# Patient Record
Sex: FEMALE | Race: WHITE | NOT HISPANIC OR LATINO | Employment: UNEMPLOYED | URBAN - METROPOLITAN AREA
[De-identification: names, ages, dates, MRNs, and addresses within clinical notes are randomized per-mention and may not be internally consistent; named-entity substitution may affect disease eponyms.]

---

## 2022-01-10 ENCOUNTER — OFFICE VISIT (OUTPATIENT)
Dept: OBSTETRICS AND GYNECOLOGY | Facility: CLINIC | Age: 27
End: 2022-01-10
Payer: COMMERCIAL

## 2022-01-10 ENCOUNTER — LAB VISIT (OUTPATIENT)
Dept: LAB | Facility: HOSPITAL | Age: 27
End: 2022-01-10
Attending: NURSE PRACTITIONER
Payer: COMMERCIAL

## 2022-01-10 VITALS — DIASTOLIC BLOOD PRESSURE: 70 MMHG | HEIGHT: 62 IN | SYSTOLIC BLOOD PRESSURE: 112 MMHG

## 2022-01-10 DIAGNOSIS — O21.9 NAUSEA/VOMITING IN PREGNANCY: ICD-10-CM

## 2022-01-10 DIAGNOSIS — Z32.00 POSSIBLE PREGNANCY: Primary | ICD-10-CM

## 2022-01-10 DIAGNOSIS — Z32.00 POSSIBLE PREGNANCY: ICD-10-CM

## 2022-01-10 LAB
AMORPH CRY URNS QL MICRO: ABNORMAL
BACTERIA #/AREA URNS HPF: ABNORMAL /HPF
BASOPHILS # BLD AUTO: 0.03 K/UL (ref 0–0.2)
BASOPHILS NFR BLD: 0.2 % (ref 0–1.9)
BILIRUB UR QL STRIP: NEGATIVE
CLARITY UR: ABNORMAL
COLOR UR: YELLOW
DIFFERENTIAL METHOD: ABNORMAL
EOSINOPHIL # BLD AUTO: 0.1 K/UL (ref 0–0.5)
EOSINOPHIL NFR BLD: 0.6 % (ref 0–8)
ERYTHROCYTE [DISTWIDTH] IN BLOOD BY AUTOMATED COUNT: 14.2 % (ref 11.5–14.5)
GLUCOSE UR QL STRIP: NEGATIVE
HCG INTACT+B SERPL-ACNC: NORMAL MIU/ML
HCT VFR BLD AUTO: 42.9 % (ref 37–48.5)
HGB BLD-MCNC: 14 G/DL (ref 12–16)
HGB UR QL STRIP: NEGATIVE
IMM GRANULOCYTES # BLD AUTO: 0.11 K/UL (ref 0–0.04)
IMM GRANULOCYTES NFR BLD AUTO: 0.9 % (ref 0–0.5)
KETONES UR QL STRIP: NEGATIVE
LEUKOCYTE ESTERASE UR QL STRIP: ABNORMAL
LYMPHOCYTES # BLD AUTO: 2.9 K/UL (ref 1–4.8)
LYMPHOCYTES NFR BLD: 23.7 % (ref 18–48)
MCH RBC QN AUTO: 28.3 PG (ref 27–31)
MCHC RBC AUTO-ENTMCNC: 32.6 G/DL (ref 32–36)
MCV RBC AUTO: 87 FL (ref 82–98)
MICROSCOPIC COMMENT: ABNORMAL
MONOCYTES # BLD AUTO: 1.1 K/UL (ref 0.3–1)
MONOCYTES NFR BLD: 8.6 % (ref 4–15)
NEUTROPHILS # BLD AUTO: 8.1 K/UL (ref 1.8–7.7)
NEUTROPHILS NFR BLD: 66 % (ref 38–73)
NITRITE UR QL STRIP: NEGATIVE
NRBC BLD-RTO: 0 /100 WBC
PH UR STRIP: 6 [PH] (ref 5–8)
PLATELET # BLD AUTO: 289 K/UL (ref 150–450)
PMV BLD AUTO: 11 FL (ref 9.2–12.9)
PROT UR QL STRIP: NEGATIVE
RBC # BLD AUTO: 4.95 M/UL (ref 4–5.4)
SP GR UR STRIP: >=1.03 (ref 1–1.03)
SQUAMOUS #/AREA URNS HPF: 12 /HPF
URN SPEC COLLECT METH UR: ABNORMAL
WBC # BLD AUTO: 12.34 K/UL (ref 3.9–12.7)
WBC #/AREA URNS HPF: 12 /HPF (ref 0–5)

## 2022-01-10 PROCEDURE — 3078F PR MOST RECENT DIASTOLIC BLOOD PRESSURE < 80 MM HG: ICD-10-PCS | Mod: CPTII,S$GLB,, | Performed by: NURSE PRACTITIONER

## 2022-01-10 PROCEDURE — 99999 PR PBB SHADOW E&M-NEW PATIENT-LVL III: ICD-10-PCS | Mod: PBBFAC,,, | Performed by: NURSE PRACTITIONER

## 2022-01-10 PROCEDURE — 3074F SYST BP LT 130 MM HG: CPT | Mod: CPTII,S$GLB,, | Performed by: NURSE PRACTITIONER

## 2022-01-10 PROCEDURE — 87591 N.GONORRHOEAE DNA AMP PROB: CPT | Performed by: NURSE PRACTITIONER

## 2022-01-10 PROCEDURE — 84702 CHORIONIC GONADOTROPIN TEST: CPT | Performed by: NURSE PRACTITIONER

## 2022-01-10 PROCEDURE — 85025 COMPLETE CBC W/AUTO DIFF WBC: CPT | Performed by: NURSE PRACTITIONER

## 2022-01-10 PROCEDURE — 81000 URINALYSIS NONAUTO W/SCOPE: CPT | Performed by: NURSE PRACTITIONER

## 2022-01-10 PROCEDURE — 86901 BLOOD TYPING SEROLOGIC RH(D): CPT | Performed by: NURSE PRACTITIONER

## 2022-01-10 PROCEDURE — 88141 CYTOPATH C/V INTERPRET: CPT | Mod: ,,, | Performed by: PATHOLOGY

## 2022-01-10 PROCEDURE — 88141 PR  CYTOPATH CERV/VAG INTERPRET: ICD-10-PCS | Mod: ,,, | Performed by: PATHOLOGY

## 2022-01-10 PROCEDURE — 36415 COLL VENOUS BLD VENIPUNCTURE: CPT | Performed by: NURSE PRACTITIONER

## 2022-01-10 PROCEDURE — 81025 PR  URINE PREGNANCY TEST: ICD-10-PCS | Mod: S$GLB,,, | Performed by: NURSE PRACTITIONER

## 2022-01-10 PROCEDURE — 87491 CHLMYD TRACH DNA AMP PROBE: CPT | Performed by: NURSE PRACTITIONER

## 2022-01-10 PROCEDURE — 87086 URINE CULTURE/COLONY COUNT: CPT | Performed by: NURSE PRACTITIONER

## 2022-01-10 PROCEDURE — 3078F DIAST BP <80 MM HG: CPT | Mod: CPTII,S$GLB,, | Performed by: NURSE PRACTITIONER

## 2022-01-10 PROCEDURE — 86762 RUBELLA ANTIBODY: CPT | Performed by: NURSE PRACTITIONER

## 2022-01-10 PROCEDURE — 88175 CYTOPATH C/V AUTO FLUID REDO: CPT | Performed by: PATHOLOGY

## 2022-01-10 PROCEDURE — 1160F RVW MEDS BY RX/DR IN RCRD: CPT | Mod: CPTII,S$GLB,, | Performed by: NURSE PRACTITIONER

## 2022-01-10 PROCEDURE — 1159F PR MEDICATION LIST DOCUMENTED IN MEDICAL RECORD: ICD-10-PCS | Mod: CPTII,S$GLB,, | Performed by: NURSE PRACTITIONER

## 2022-01-10 PROCEDURE — 1159F MED LIST DOCD IN RCRD: CPT | Mod: CPTII,S$GLB,, | Performed by: NURSE PRACTITIONER

## 2022-01-10 PROCEDURE — 80074 ACUTE HEPATITIS PANEL: CPT | Performed by: NURSE PRACTITIONER

## 2022-01-10 PROCEDURE — 86592 SYPHILIS TEST NON-TREP QUAL: CPT | Performed by: NURSE PRACTITIONER

## 2022-01-10 PROCEDURE — 99999 PR PBB SHADOW E&M-NEW PATIENT-LVL III: CPT | Mod: PBBFAC,,, | Performed by: NURSE PRACTITIONER

## 2022-01-10 PROCEDURE — 3074F PR MOST RECENT SYSTOLIC BLOOD PRESSURE < 130 MM HG: ICD-10-PCS | Mod: CPTII,S$GLB,, | Performed by: NURSE PRACTITIONER

## 2022-01-10 PROCEDURE — 81025 URINE PREGNANCY TEST: CPT | Mod: S$GLB,,, | Performed by: NURSE PRACTITIONER

## 2022-01-10 PROCEDURE — 1160F PR REVIEW ALL MEDS BY PRESCRIBER/CLIN PHARMACIST DOCUMENTED: ICD-10-PCS | Mod: CPTII,S$GLB,, | Performed by: NURSE PRACTITIONER

## 2022-01-10 PROCEDURE — 99204 OFFICE O/P NEW MOD 45 MIN: CPT | Mod: S$GLB,,, | Performed by: NURSE PRACTITIONER

## 2022-01-10 PROCEDURE — 87389 HIV-1 AG W/HIV-1&-2 AB AG IA: CPT | Performed by: NURSE PRACTITIONER

## 2022-01-10 PROCEDURE — 99204 PR OFFICE/OUTPT VISIT, NEW, LEVL IV, 45-59 MIN: ICD-10-PCS | Mod: S$GLB,,, | Performed by: NURSE PRACTITIONER

## 2022-01-10 RX ORDER — PROMETHAZINE HYDROCHLORIDE 12.5 MG/1
12.5 TABLET ORAL 4 TIMES DAILY
Qty: 60 TABLET | Refills: 1 | Status: SHIPPED | OUTPATIENT
Start: 2022-01-10 | End: 2022-08-15

## 2022-01-10 RX ORDER — ONDANSETRON 4 MG/1
4 TABLET, ORALLY DISINTEGRATING ORAL 2 TIMES DAILY
Qty: 60 TABLET | Refills: 1 | Status: SHIPPED | OUTPATIENT
Start: 2022-01-10 | End: 2022-08-15

## 2022-01-10 NOTE — PROGRESS NOTES
CHIEF COMPLAINT:   Patient presents with      initial OB        HISTORY OF PRESENT ILLNESS  Brittny Turk 26 y.o.  presents for initial OB  Severe nausea with vomiting multiple times per day. No bleeding or pain.  Pregnancy was planned.  Partner is supportive of pregnancy.  Lives at home with .  2 cats and 1 dog.  Currently unemployed.  Denies domestic abuse.  Denies chemical/pesticide/radiation exposure.    OB history:  OB History    Para Term  AB Living   0 0 0 0 0 0   SAB IAB Ectopic Multiple Live Births   0 0 0 0 0         LMP: Patient's last menstrual period was 2021.  EDC: Estimated Date of Delivery: 22  EGA: 9w 2d      Health Maintenance   Topic Date Due    Hepatitis C Screening  Never done    Lipid Panel  Never done    HPV Vaccines (1 - 2-dose series) Never done    TETANUS VACCINE  Never done    Pap Smear  Never done       History reviewed. No pertinent past medical history.    History reviewed. No pertinent surgical history.    History reviewed. No pertinent family history.    Social History     Socioeconomic History    Marital status:    Tobacco Use    Smoking status: Never Smoker    Smokeless tobacco: Never Used   Substance and Sexual Activity    Alcohol use: Not Currently    Drug use: Never    Sexual activity: Yes     Partners: Male       Current Outpatient Medications   Medication Sig Dispense Refill    prenatal 105-iron-folic ac-dha 30 mg iron- 1.4 mg-300 mg Cmpk Take by mouth.      ondansetron (ZOFRAN-ODT) 4 MG TbDL Take 1 tablet (4 mg total) by mouth 2 (two) times daily. 60 tablet 1    promethazine (PHENERGAN) 12.5 MG Tab Take 1 tablet (12.5 mg total) by mouth 4 (four) times daily. 60 tablet 1     No current facility-administered medications for this visit.       Review of patient's allergies indicates:   Allergen Reactions    Codeine      Panic attacks    Penicillins Rash    Sulfa (sulfonamide antibiotics) Rash         PHYSICAL EXAM    Vitals:    01/10/22 1139   BP: 112/70        PAIN SCALE: 0/10 None    REVIEW OF SYSTEMS:    GENERAL: No fever, chills, fatigability or weight loss.  CV: Denies chest pain  PULM: Denies shortness of breath or wheezing.  ABDOMEN: Appetite fine. No weight loss. Denies diarrhea, abdominal pain, hematemesis or blood in stool.  URINARY: No flank pain, dysuria or hematuria.  REPRODUCTIVE: No abnormal vaginal bleeding.     PHYSICAL EXAM:     APPEARANCE: Well nourished, well developed, in no acute distress  CHEST: Clear to auscultation bilaterally  CV: Regular rate and rhythm  BREASTS: Symmetrical, no skin changes or visible lesions. No palpable masses, nipple discharge or adenopathy bilaterally.  ABDOMEN: Soft. No tenderness or masses. No hepatosplenomegaly. No hernias  PELVIC:   VULVA: No lesions. Normal female genitalia.  URETHRAL MEATUS: Normal size and location, no lesions, no prolapse.  URETHRA: No masses, tenderness, prolapse or scarring.  VAGINA: Moist and well rugated, no discharge, no significant cystocele or rectocele.  CERVIX: No lesions, normal diameter, no stenosis, no cervical motion tenderness.   UTERUS: 9 week size, regular shape, mobile, non-tender, normal position, good support.  ADNEXA: No masses, tenderness or CDS nodularity.  ANUS PERINEUM: No lesions, no relaxation, no external hemorrhoids.     UPT +    ASSESSMENT AND PLAN:    Brittny was seen today for possible pregnancy.    Diagnoses and all orders for this visit:    Possible pregnancy  -     POCT urine pregnancy  -     C. trachomatis/N. gonorrhoeae by AMP DNA  -     CBC Auto Differential; Future  -     HCG, Quantitative; Future  -     Hepatitis Panel, Acute; Future  -     HIV 1/2 Ag/Ab (4th Gen); Future  -     Liquid-Based Pap Smear, Screening  -     Rubella Antibody, IgG; Future  -     RPR; Future  -     Type & Screen; Future  -     Urinalysis, Reflex to Urine Culture Urine, Clean Catch  -     US OB/GYN Procedure ([facility]);  Future    Nausea/vomiting in pregnancy  -     ondansetron (ZOFRAN-ODT) 4 MG TbDL; Take 1 tablet (4 mg total) by mouth 2 (two) times daily.  -     promethazine (PHENERGAN) 12.5 MG Tab; Take 1 tablet (12.5 mg total) by mouth 4 (four) times daily.       -      Patient was counseled today on A.C.S. Pap guidelines and recommendations for yearly pelvic exams, mammograms and monthly self breast exams; to see her PCP for other health maintenance and pregnancy.   -      Patient's medications and medical history reviewed with patient and implications in pregnancy.   -      Pregnancy course discussed.  -      Patient was counseled on exercise in pregnancy and proper weight gain based on the [place] [employer]'s recommendations based on her pre-pregnancy weight.     -      BMI     Discussed IOM recommended weight gain of:     Underweight  Less than 18.5 28-40      Normal Weight 18.5-24.9  25-35      Overweight  25-29.9  15-25      Obese    30 and greater  11-20       Discussed criteria for delivery at [facility] r/t excessive pre-preg weight or excessive weight gain:     Pre-pregnancy BMI over 40 or excess pregnancy weight gain defined as:     Pre-preg BMI < 18.5;   Excess weight gain = > 60 pound     Pre-preg BMI 18.5-24.9;   Excess weight gain = > 53 pounds     Pre-preg BMI 25-29.9;   Excess weight gain = > 38 pounds     Pre-preg BMI > 30;    Excess weight gain = > 30 pounds    -      Discussed foods to avoid in pregnancy (i.e. sushi, fish that are high in mercury, deli meat, and unpasteurized cheeses).   -      Discussed prenatal vitamin options (i.e. stool softener, DHA).  -      Discussed potential medical problems in pregnancy.  -      Discussed risk of Toxoplasmosis transmission from pets and reviewed risk reduction techniques.  -      Pt was counseled on travel recommendations and on risks of Zika virus exposure.  Current CDC Zika advisories and prevention techniques were reviewed with pt.  Pt denies any recent  international travel and does not plan travel during pregnancy.  Pt reports that partner does not plan travel either.  -      Covid prevention reviewed with patient.  -      Oriented to practice including CNM collaboration.   -      Follow-up routine OB labs and u/s in 1 Week.

## 2022-01-10 NOTE — PATIENT INSTRUCTIONS
Nausea and vomiting in pregnancy    During the early stages of pregnancy, nausea and vomiting is common. They will usually begin to ease and stop once you enter the second trimester, 12-14 weeks gestation.     To help combat nausea:  -Eat crackers or dry toast before getting out of bed. Avoid sudden movements, get out of bed slowly  -Eat small amounts often. This helps prevent the stomach from being empty, which can make nausea worse.   -Choose dry foods such as crackers and alternate with sipping cold, clear drinks. Avoid greasy, spicy foods  -Vitamin B6 25 mg twice a day with 1/2 Unisom tablet at night  -Ginger in any form-candy, ginger ale, ginger snaps, ginger tablets, ginger teas to help ease nausea.   -Emetrol or non-drowsy Dramamine are over the counter medications available  -Sea Bands    If unable to keep food or drinks down and has tried over the counter medications, please list what has been tried and route to provider for additional recommendations.

## 2022-01-11 LAB
ABO + RH BLD: NORMAL
BLD GP AB SCN CELLS X3 SERPL QL: NORMAL
HAV IGM SERPL QL IA: NEGATIVE
HBV CORE IGM SERPL QL IA: NEGATIVE
HBV SURFACE AG SERPL QL IA: NEGATIVE
HCV AB SERPL QL IA: NEGATIVE
HIV 1+2 AB+HIV1 P24 AG SERPL QL IA: NEGATIVE
RPR SER QL: NORMAL
RUBV IGG SER-ACNC: 380 IU/ML
RUBV IGG SER-IMP: REACTIVE

## 2022-01-12 LAB
BACTERIA UR CULT: NORMAL
BACTERIA UR CULT: NORMAL

## 2022-01-13 LAB
C TRACH DNA SPEC QL NAA+PROBE: NOT DETECTED
N GONORRHOEA DNA SPEC QL NAA+PROBE: NOT DETECTED

## 2022-01-14 LAB
FINAL PATHOLOGIC DIAGNOSIS: NORMAL
Lab: NORMAL

## 2022-01-21 ENCOUNTER — INITIAL PRENATAL (OUTPATIENT)
Dept: OBSTETRICS AND GYNECOLOGY | Facility: CLINIC | Age: 27
End: 2022-01-21
Payer: COMMERCIAL

## 2022-01-21 ENCOUNTER — PROCEDURE VISIT (OUTPATIENT)
Dept: OBSTETRICS AND GYNECOLOGY | Facility: CLINIC | Age: 27
End: 2022-01-21
Payer: COMMERCIAL

## 2022-01-21 VITALS
BODY MASS INDEX: 41.77 KG/M2 | DIASTOLIC BLOOD PRESSURE: 68 MMHG | SYSTOLIC BLOOD PRESSURE: 112 MMHG | WEIGHT: 228.38 LBS

## 2022-01-21 DIAGNOSIS — O99.211 OBESITY AFFECTING PREGNANCY IN FIRST TRIMESTER: ICD-10-CM

## 2022-01-21 DIAGNOSIS — Z34.91 NORMAL PREGNANCY IN FIRST TRIMESTER: ICD-10-CM

## 2022-01-21 DIAGNOSIS — Z3A.10 10 WEEKS GESTATION OF PREGNANCY: Primary | ICD-10-CM

## 2022-01-21 DIAGNOSIS — Z32.00 POSSIBLE PREGNANCY: ICD-10-CM

## 2022-01-21 PROCEDURE — 99999 PR PBB SHADOW E&M-EST. PATIENT-LVL II: CPT | Mod: PBBFAC,,, | Performed by: ADVANCED PRACTICE MIDWIFE

## 2022-01-21 PROCEDURE — 76801 US OB/GYN PROCEDURE (VIEWPOINT): ICD-10-PCS | Mod: S$GLB,,, | Performed by: OBSTETRICS & GYNECOLOGY

## 2022-01-21 PROCEDURE — 0502F PR SUBSEQUENT PRENATAL CARE: ICD-10-PCS | Mod: CPTII,S$GLB,, | Performed by: ADVANCED PRACTICE MIDWIFE

## 2022-01-21 PROCEDURE — 0502F SUBSEQUENT PRENATAL CARE: CPT | Mod: CPTII,S$GLB,, | Performed by: ADVANCED PRACTICE MIDWIFE

## 2022-01-21 PROCEDURE — 99999 PR PBB SHADOW E&M-EST. PATIENT-LVL II: ICD-10-PCS | Mod: PBBFAC,,, | Performed by: ADVANCED PRACTICE MIDWIFE

## 2022-01-21 PROCEDURE — 76801 OB US < 14 WKS SINGLE FETUS: CPT | Mod: S$GLB,,, | Performed by: OBSTETRICS & GYNECOLOGY

## 2022-01-21 NOTE — PROGRESS NOTES
NOB s=d oriented to practice  sono done 10w3d  no fluid seen in cul de sac  Labs reviewed  A-Z book given and reviewed  Weight goal 20 pounds set  Nausea improving

## 2022-02-18 ENCOUNTER — ROUTINE PRENATAL (OUTPATIENT)
Dept: OBSTETRICS AND GYNECOLOGY | Facility: CLINIC | Age: 27
End: 2022-02-18
Payer: COMMERCIAL

## 2022-02-18 VITALS — DIASTOLIC BLOOD PRESSURE: 80 MMHG | BODY MASS INDEX: 42.1 KG/M2 | WEIGHT: 230.19 LBS | SYSTOLIC BLOOD PRESSURE: 110 MMHG

## 2022-02-18 DIAGNOSIS — Z34.91 NORMAL PREGNANCY IN FIRST TRIMESTER: ICD-10-CM

## 2022-02-18 DIAGNOSIS — Z3A.14 14 WEEKS GESTATION OF PREGNANCY: ICD-10-CM

## 2022-02-18 DIAGNOSIS — O99.211 OBESITY AFFECTING PREGNANCY IN FIRST TRIMESTER: Primary | ICD-10-CM

## 2022-02-18 PROCEDURE — 0502F SUBSEQUENT PRENATAL CARE: CPT | Mod: CPTII,S$GLB,, | Performed by: ADVANCED PRACTICE MIDWIFE

## 2022-02-18 PROCEDURE — 99999 PR PBB SHADOW E&M-EST. PATIENT-LVL II: ICD-10-PCS | Mod: PBBFAC,,, | Performed by: ADVANCED PRACTICE MIDWIFE

## 2022-02-18 PROCEDURE — 0502F PR SUBSEQUENT PRENATAL CARE: ICD-10-PCS | Mod: CPTII,S$GLB,, | Performed by: ADVANCED PRACTICE MIDWIFE

## 2022-02-18 PROCEDURE — 99999 PR PBB SHADOW E&M-EST. PATIENT-LVL II: CPT | Mod: PBBFAC,,, | Performed by: ADVANCED PRACTICE MIDWIFE

## 2022-02-18 NOTE — PROGRESS NOTES
"26 y.o. female  at 14w3d    feeling flutters, denies VB, LOF or cramping  Doing well without concerns   TWG: 3 lbs   Reviewed prenatal labs  Genetic testing discussed Mat 21 not covered Will do quad at 15-20 weeks  Anatomy scan at 20 weeks  Flu vaccine recommended and declines    Patient was provided with [facility] handouts: Benefits of Breastfeeding, "Exclusive Breastfeeding," and Skin to Skin with Your Baby. Discussed benefits of skin to skin, the magic first hour, delaying routine procedures, benefits of breastfeeding, and the importance of the first early feeding, and the importance of exclusive breastfeeding. Encouraged patient to attend [facility]s Prenatal Breastfeeding Class and to download the [employer] mobile evens if she has not already done so.  Patient verbalizes understanding.     Reviewed warning signs, pregnancy precautions and how/when to call.  RTC x 4 wks, call or present sooner prn.     I spent a total of 15 minutes on the day of the visit.This includes face to face time and non-face to face time preparing to see the patient (eg, review of tests), Obtaining and/or reviewing separately obtained history, Documenting clinical information in the electronic or other health record, Independently interpreting results and communicating results to the patient/family/caregiver, or Care coordination.         "

## 2022-03-18 ENCOUNTER — LAB VISIT (OUTPATIENT)
Dept: LAB | Facility: HOSPITAL | Age: 27
End: 2022-03-18
Attending: ADVANCED PRACTICE MIDWIFE
Payer: COMMERCIAL

## 2022-03-18 ENCOUNTER — ROUTINE PRENATAL (OUTPATIENT)
Dept: OBSTETRICS AND GYNECOLOGY | Facility: CLINIC | Age: 27
End: 2022-03-18
Payer: COMMERCIAL

## 2022-03-18 VITALS
SYSTOLIC BLOOD PRESSURE: 118 MMHG | BODY MASS INDEX: 41.21 KG/M2 | WEIGHT: 225.31 LBS | DIASTOLIC BLOOD PRESSURE: 76 MMHG

## 2022-03-18 DIAGNOSIS — O99.211 OBESITY AFFECTING PREGNANCY IN FIRST TRIMESTER: ICD-10-CM

## 2022-03-18 DIAGNOSIS — Z3A.18 18 WEEKS GESTATION OF PREGNANCY: ICD-10-CM

## 2022-03-18 DIAGNOSIS — Z13.79 GENETIC SCREENING: ICD-10-CM

## 2022-03-18 DIAGNOSIS — Z34.92 NORMAL PREGNANCY IN SECOND TRIMESTER: ICD-10-CM

## 2022-03-18 DIAGNOSIS — Z34.92 NORMAL PREGNANCY IN SECOND TRIMESTER: Primary | ICD-10-CM

## 2022-03-18 PROCEDURE — 0502F SUBSEQUENT PRENATAL CARE: CPT | Mod: CPTII,S$GLB,, | Performed by: ADVANCED PRACTICE MIDWIFE

## 2022-03-18 PROCEDURE — 99999 PR PBB SHADOW E&M-EST. PATIENT-LVL II: CPT | Mod: PBBFAC,,, | Performed by: ADVANCED PRACTICE MIDWIFE

## 2022-03-18 PROCEDURE — 81511 FTL CGEN ABNOR FOUR ANAL: CPT | Performed by: ADVANCED PRACTICE MIDWIFE

## 2022-03-18 PROCEDURE — 0502F PR SUBSEQUENT PRENATAL CARE: ICD-10-PCS | Mod: CPTII,S$GLB,, | Performed by: ADVANCED PRACTICE MIDWIFE

## 2022-03-18 PROCEDURE — 36415 COLL VENOUS BLD VENIPUNCTURE: CPT | Performed by: ADVANCED PRACTICE MIDWIFE

## 2022-03-18 PROCEDURE — 99999 PR PBB SHADOW E&M-EST. PATIENT-LVL II: ICD-10-PCS | Mod: PBBFAC,,, | Performed by: ADVANCED PRACTICE MIDWIFE

## 2022-03-18 NOTE — PROGRESS NOTES
"26 y.o. female  at 18w3d    feeling flutters, denies VB, LOF or cramping    Doing well without concerns ,working out feeling much better    TWG: -1 lbs   Reviewed prenatal labs  Genetic testing discussed quad today  Anatomy scan ordered  Flu vaccine recommended and declines    Patient was provided with [facility] handouts: Benefits of Breastfeeding, "Exclusive Breastfeeding," and Skin to Skin with Your Baby. Discussed benefits of skin to skin, the magic first hour, delaying routine procedures, benefits of breastfeeding, and the importance of the first early feeding, and the importance of exclusive breastfeeding. Encouraged patient to attend [facility]s Prenatal Breastfeeding Class and to download the [employer] mobile evens if she has not already done so.  Patient verbalizes understanding.     Reviewed warning signs, pregnancy precautions and how/when to call.  RTC x 2 wks, call or present sooner prn.     I spent a total of 15 minutes on the day of the visit.This includes face to face time and non-face to face time preparing to see the patient (eg, review of tests), Obtaining and/or reviewing separately obtained history, Documenting clinical information in the electronic or other health record, Independently interpreting resultsand communicating results to the patient/family/caregiver, or Care coordination.         "

## 2022-03-23 ENCOUNTER — PATIENT MESSAGE (OUTPATIENT)
Dept: OBSTETRICS AND GYNECOLOGY | Facility: CLINIC | Age: 27
End: 2022-03-23
Payer: COMMERCIAL

## 2022-03-23 LAB
# FETUSES US: NORMAL
2ND TRIMESTER 4 SCREEN PNL SERPL: NEGATIVE
2ND TRIMESTER 4 SCREEN SERPL-IMP: NORMAL
AFP MOM SERPL: 1.1
AFP SERPL-MCNC: 40.6 NG/ML
AGE AT DELIVERY: 27
B-HCG MOM SERPL: 0.97
B-HCG SERPL-ACNC: 18.8 IU/ML
FET TS 21 RISK FROM MAT AGE: NORMAL
GA (DAYS): 3 D
GA (WEEKS): 18 WK
GA METHOD: NORMAL
IDDM PATIENT QL: NORMAL
INHIBIN A MOM SERPL: 1.32
INHIBIN A SERPL-MCNC: 161.1 PG/ML
SMOKING STATUS FTND: NO
TS 18 RISK FETUS: NORMAL
TS 21 RISK FETUS: NORMAL
U ESTRIOL MOM SERPL: 1.81
U ESTRIOL SERPL-MCNC: 2.74 NG/ML

## 2022-04-04 ENCOUNTER — ROUTINE PRENATAL (OUTPATIENT)
Dept: OBSTETRICS AND GYNECOLOGY | Facility: CLINIC | Age: 27
End: 2022-04-04
Payer: COMMERCIAL

## 2022-04-04 ENCOUNTER — PROCEDURE VISIT (OUTPATIENT)
Dept: OBSTETRICS AND GYNECOLOGY | Facility: CLINIC | Age: 27
End: 2022-04-04
Payer: COMMERCIAL

## 2022-04-04 VITALS — DIASTOLIC BLOOD PRESSURE: 74 MMHG | BODY MASS INDEX: 42.3 KG/M2 | SYSTOLIC BLOOD PRESSURE: 110 MMHG | WEIGHT: 231.25 LBS

## 2022-04-04 DIAGNOSIS — Z34.92 NORMAL PREGNANCY IN SECOND TRIMESTER: ICD-10-CM

## 2022-04-04 DIAGNOSIS — Z34.92 NORMAL PREGNANCY IN SECOND TRIMESTER: Primary | ICD-10-CM

## 2022-04-04 PROCEDURE — 0502F PR SUBSEQUENT PRENATAL CARE: ICD-10-PCS | Mod: CPTII,S$GLB,, | Performed by: MIDWIFE

## 2022-04-04 PROCEDURE — 99999 PR PBB SHADOW E&M-EST. PATIENT-LVL III: CPT | Mod: PBBFAC,,, | Performed by: MIDWIFE

## 2022-04-04 PROCEDURE — 99999 PR PBB SHADOW E&M-EST. PATIENT-LVL III: ICD-10-PCS | Mod: PBBFAC,,, | Performed by: MIDWIFE

## 2022-04-04 PROCEDURE — 76805 OB US >/= 14 WKS SNGL FETUS: CPT | Mod: S$GLB,,, | Performed by: OBSTETRICS & GYNECOLOGY

## 2022-04-04 PROCEDURE — 76805 US OB/GYN PROCEDURE (VIEWPOINT): ICD-10-PCS | Mod: S$GLB,,, | Performed by: OBSTETRICS & GYNECOLOGY

## 2022-04-04 PROCEDURE — 0502F SUBSEQUENT PRENATAL CARE: CPT | Mod: CPTII,S$GLB,, | Performed by: MIDWIFE

## 2022-04-04 NOTE — PROGRESS NOTES
Doing well, no complaints  + fetal movement  Denies CTX, VB, LOF  Quad screen negative, reviewed with pt and FOB  Discussed birth plan. Likely epidural.   U/S: anterior placenta, 31%tile. Anatomy scan normal and complete.  4 lb 3 oz TWG  PTL precautions  RTC in 4 weeks, sooner if needed

## 2022-05-03 ENCOUNTER — PATIENT MESSAGE (OUTPATIENT)
Dept: ADMINISTRATIVE | Facility: OTHER | Age: 27
End: 2022-05-03
Payer: COMMERCIAL

## 2022-05-04 ENCOUNTER — TELEPHONE (OUTPATIENT)
Dept: OBSTETRICS AND GYNECOLOGY | Facility: CLINIC | Age: 27
End: 2022-05-04
Payer: COMMERCIAL

## 2022-05-04 NOTE — TELEPHONE ENCOUNTER
Spoke with pt about ETA for 10:45am appointment with Mina Polanco. Pt stated that GPS was taking her all over [place]. Pt asked if she could be rescheduled to 5/5. Asked pt what location she preferred. Pt stated [place]. Rescheduled pt to 5/5 at 7:45am with Nicolas Haley at the [place] location. Pt verbalized understanding and agreed to date, time, and location.

## 2022-05-05 ENCOUNTER — ROUTINE PRENATAL (OUTPATIENT)
Dept: OBSTETRICS AND GYNECOLOGY | Facility: CLINIC | Age: 27
End: 2022-05-05
Payer: COMMERCIAL

## 2022-05-05 VITALS — DIASTOLIC BLOOD PRESSURE: 68 MMHG | BODY MASS INDEX: 42.7 KG/M2 | WEIGHT: 233.44 LBS | SYSTOLIC BLOOD PRESSURE: 116 MMHG

## 2022-05-05 DIAGNOSIS — O99.212 OBESITY DURING PREGNANCY IN SECOND TRIMESTER: Primary | ICD-10-CM

## 2022-05-05 DIAGNOSIS — Z3A.25 25 WEEKS GESTATION OF PREGNANCY: ICD-10-CM

## 2022-05-05 PROBLEM — Z34.92 NORMAL PREGNANCY IN SECOND TRIMESTER: Status: RESOLVED | Noted: 2022-04-04 | Resolved: 2022-05-05

## 2022-05-05 PROCEDURE — 0502F SUBSEQUENT PRENATAL CARE: CPT | Mod: CPTII,S$GLB,, | Performed by: MIDWIFE

## 2022-05-05 PROCEDURE — 99999 PR PBB SHADOW E&M-EST. PATIENT-LVL II: CPT | Mod: PBBFAC,,, | Performed by: MIDWIFE

## 2022-05-05 PROCEDURE — 99999 PR PBB SHADOW E&M-EST. PATIENT-LVL II: ICD-10-PCS | Mod: PBBFAC,,, | Performed by: MIDWIFE

## 2022-05-05 PROCEDURE — 0502F PR SUBSEQUENT PRENATAL CARE: ICD-10-PCS | Mod: CPTII,S$GLB,, | Performed by: MIDWIFE

## 2022-05-05 NOTE — PROGRESS NOTES
26 y.o. female  at 25w2d   Reports + FM, denies VB, LOF, or cramping  Doing well without concerns, pt fell from last 3 steps last week and landed on her but, no bleeding or cramping  TW lbs   Breast pump Rx: given  Reviewed upcoming 28wk labs, (B POS) and orders placed, tdap handout provided and explained  Reviewed warning signs, normal FM,  labor precautions and how/when to call.  RTC x 3-4 wks, call or present sooner prn.

## 2022-05-25 ENCOUNTER — LAB VISIT (OUTPATIENT)
Dept: LAB | Facility: HOSPITAL | Age: 27
End: 2022-05-25
Attending: ADVANCED PRACTICE MIDWIFE
Payer: COMMERCIAL

## 2022-05-25 ENCOUNTER — ROUTINE PRENATAL (OUTPATIENT)
Dept: OBSTETRICS AND GYNECOLOGY | Facility: CLINIC | Age: 27
End: 2022-05-25
Payer: COMMERCIAL

## 2022-05-25 VITALS
BODY MASS INDEX: 39.48 KG/M2 | WEIGHT: 215.81 LBS | SYSTOLIC BLOOD PRESSURE: 116 MMHG | DIASTOLIC BLOOD PRESSURE: 84 MMHG

## 2022-05-25 DIAGNOSIS — O99.213 OBESITY DURING PREGNANCY IN THIRD TRIMESTER: Primary | ICD-10-CM

## 2022-05-25 DIAGNOSIS — O99.212 OBESITY DURING PREGNANCY IN SECOND TRIMESTER: ICD-10-CM

## 2022-05-25 LAB
BASOPHILS # BLD AUTO: 0.04 K/UL (ref 0–0.2)
BASOPHILS NFR BLD: 0.4 % (ref 0–1.9)
DIFFERENTIAL METHOD: ABNORMAL
EOSINOPHIL # BLD AUTO: 0.1 K/UL (ref 0–0.5)
EOSINOPHIL NFR BLD: 0.6 % (ref 0–8)
ERYTHROCYTE [DISTWIDTH] IN BLOOD BY AUTOMATED COUNT: 13.3 % (ref 11.5–14.5)
GLUCOSE SERPL-MCNC: 95 MG/DL (ref 70–140)
HCT VFR BLD AUTO: 37.6 % (ref 37–48.5)
HGB BLD-MCNC: 12.1 G/DL (ref 12–16)
IMM GRANULOCYTES # BLD AUTO: 0.15 K/UL (ref 0–0.04)
IMM GRANULOCYTES NFR BLD AUTO: 1.3 % (ref 0–0.5)
LYMPHOCYTES # BLD AUTO: 2 K/UL (ref 1–4.8)
LYMPHOCYTES NFR BLD: 18 % (ref 18–48)
MCH RBC QN AUTO: 29.4 PG (ref 27–31)
MCHC RBC AUTO-ENTMCNC: 32.2 G/DL (ref 32–36)
MCV RBC AUTO: 91 FL (ref 82–98)
MONOCYTES # BLD AUTO: 1 K/UL (ref 0.3–1)
MONOCYTES NFR BLD: 8.7 % (ref 4–15)
NEUTROPHILS # BLD AUTO: 8 K/UL (ref 1.8–7.7)
NEUTROPHILS NFR BLD: 71 % (ref 38–73)
NRBC BLD-RTO: 0 /100 WBC
PLATELET # BLD AUTO: 215 K/UL (ref 150–450)
PMV BLD AUTO: 11.7 FL (ref 9.2–12.9)
RBC # BLD AUTO: 4.12 M/UL (ref 4–5.4)
WBC # BLD AUTO: 11.32 K/UL (ref 3.9–12.7)

## 2022-05-25 PROCEDURE — 99999 PR PBB SHADOW E&M-EST. PATIENT-LVL II: ICD-10-PCS | Mod: PBBFAC,,, | Performed by: ADVANCED PRACTICE MIDWIFE

## 2022-05-25 PROCEDURE — 90715 TDAP VACCINE 7 YRS/> IM: CPT | Mod: S$GLB,,, | Performed by: OBSTETRICS & GYNECOLOGY

## 2022-05-25 PROCEDURE — 90471 IMMUNIZATION ADMIN: CPT | Mod: S$GLB,,, | Performed by: OBSTETRICS & GYNECOLOGY

## 2022-05-25 PROCEDURE — 90471 TDAP VACCINE GREATER THAN OR EQUAL TO 7YO IM: ICD-10-PCS | Mod: S$GLB,,, | Performed by: OBSTETRICS & GYNECOLOGY

## 2022-05-25 PROCEDURE — 87389 HIV-1 AG W/HIV-1&-2 AB AG IA: CPT | Performed by: MIDWIFE

## 2022-05-25 PROCEDURE — 36415 COLL VENOUS BLD VENIPUNCTURE: CPT | Performed by: MIDWIFE

## 2022-05-25 PROCEDURE — 86592 SYPHILIS TEST NON-TREP QUAL: CPT | Performed by: MIDWIFE

## 2022-05-25 PROCEDURE — 0502F PR SUBSEQUENT PRENATAL CARE: ICD-10-PCS | Mod: CPTII,S$GLB,, | Performed by: ADVANCED PRACTICE MIDWIFE

## 2022-05-25 PROCEDURE — 85025 COMPLETE CBC W/AUTO DIFF WBC: CPT | Performed by: MIDWIFE

## 2022-05-25 PROCEDURE — 0502F SUBSEQUENT PRENATAL CARE: CPT | Mod: CPTII,S$GLB,, | Performed by: ADVANCED PRACTICE MIDWIFE

## 2022-05-25 PROCEDURE — 82950 GLUCOSE TEST: CPT | Performed by: MIDWIFE

## 2022-05-25 PROCEDURE — 90715 TDAP VACCINE GREATER THAN OR EQUAL TO 7YO IM: ICD-10-PCS | Mod: S$GLB,,, | Performed by: OBSTETRICS & GYNECOLOGY

## 2022-05-25 PROCEDURE — 99999 PR PBB SHADOW E&M-EST. PATIENT-LVL II: CPT | Mod: PBBFAC,,, | Performed by: ADVANCED PRACTICE MIDWIFE

## 2022-05-25 NOTE — PROGRESS NOTES
Verified patient with 2 patient identifiers. Allergies and medications reviewed.   Tdap vaccine given IM to left deltoid using aseptic technique.   No discomfort noted. Patient tolerated well.   Patient advised to wait 15 minutes in lobby to monitor for reaction.   Patient verbalized understanding.

## 2022-05-25 NOTE — PROGRESS NOTES
26 y.o. female  at 28w1d   Reports + FM, denies VB, LOF or CTX  Doing well without concerns     TWG: -11 lbs   28wk labs today (B POS)  Tdap given today    Will start BPP's 32 weeks for BMI    Reviewed warning signs, normal FKCs,  labor precautions and how/when to call.  RTC x 2 wks, call or present sooner prn.     I spent a total of 20 minutes on the day of the visit.This includes face to face time and non-face to face time preparing to see the patient (eg, review of tests), Obtaining and/or reviewing separately obtained history, Documenting clinical information in the electronic or other health record, Independently interpreting resultsand communicating results to the patient/family/caregiver, or Care coordination.

## 2022-05-26 LAB
HIV 1+2 AB+HIV1 P24 AG SERPL QL IA: NEGATIVE
RPR SER QL: NORMAL

## 2022-06-20 ENCOUNTER — ROUTINE PRENATAL (OUTPATIENT)
Dept: OBSTETRICS AND GYNECOLOGY | Facility: CLINIC | Age: 27
End: 2022-06-20
Payer: COMMERCIAL

## 2022-06-20 VITALS
BODY MASS INDEX: 46.25 KG/M2 | SYSTOLIC BLOOD PRESSURE: 122 MMHG | DIASTOLIC BLOOD PRESSURE: 82 MMHG | WEIGHT: 252.88 LBS

## 2022-06-20 DIAGNOSIS — Z3A.31 31 WEEKS GESTATION OF PREGNANCY: Primary | ICD-10-CM

## 2022-06-20 PROCEDURE — 0502F PR SUBSEQUENT PRENATAL CARE: ICD-10-PCS | Mod: CPTII,S$GLB,,

## 2022-06-20 PROCEDURE — 99999 PR PBB SHADOW E&M-EST. PATIENT-LVL II: CPT | Mod: PBBFAC,,,

## 2022-06-20 PROCEDURE — 0502F SUBSEQUENT PRENATAL CARE: CPT | Mod: CPTII,S$GLB,,

## 2022-06-20 PROCEDURE — 99999 PR PBB SHADOW E&M-EST. PATIENT-LVL II: ICD-10-PCS | Mod: PBBFAC,,,

## 2022-06-20 NOTE — PROGRESS NOTES
27 y.o. female  at 31w6d   Reports + FM, denies VB, LOF or CTX  Doing well.  C/o constipation.  Patient states she has not had a BM in 1 week and when she does go it is dewayne.  She has tried increasing fiber and water intake and stool softeners without any relief.  Abdomen is soft and not distended.  Discussed if she cannot get relief from other methods she can try benefiber or an enema.    38 pound weight gain from last visit.  Scale was re-zeroed and patient was reweighed.  All previous weights coincide with current weight today.  Possibility that scale was not zeroed on previous visit.  Will monitor NV  TW lbs   Reviewed 28wk lab results (B POS) H/H: 12.1/37.6, RPR neg, HIV neg  Glucose screen Normal   Offer Tdap NV  Start weekly BPP for BMI NV  Reviewed warning signs, normal FKCs,  labor precautions and how/when to call. Patient states understanding.  RTC x 1 wk, call or present sooner prn.     I spent a total of 20 minutes on the day of the visit.This includes face to face time and non-face to face time preparing to see the patient (eg, review of tests), Obtaining and/or reviewing separately obtained history, Documenting clinical information in the electronic or other health record, Independently interpreting resultsand communicating results to the patient/family/caregiver, or Care coordination.

## 2022-06-21 ENCOUNTER — PATIENT MESSAGE (OUTPATIENT)
Dept: ADMINISTRATIVE | Facility: OTHER | Age: 27
End: 2022-06-21
Payer: COMMERCIAL

## 2022-06-27 ENCOUNTER — PROCEDURE VISIT (OUTPATIENT)
Dept: OBSTETRICS AND GYNECOLOGY | Facility: CLINIC | Age: 27
End: 2022-06-27
Payer: COMMERCIAL

## 2022-06-27 ENCOUNTER — ROUTINE PRENATAL (OUTPATIENT)
Dept: OBSTETRICS AND GYNECOLOGY | Facility: CLINIC | Age: 27
End: 2022-06-27
Payer: COMMERCIAL

## 2022-06-27 VITALS
WEIGHT: 248.69 LBS | SYSTOLIC BLOOD PRESSURE: 112 MMHG | DIASTOLIC BLOOD PRESSURE: 80 MMHG | BODY MASS INDEX: 45.48 KG/M2

## 2022-06-27 DIAGNOSIS — O99.212 OBESITY DURING PREGNANCY IN SECOND TRIMESTER: ICD-10-CM

## 2022-06-27 DIAGNOSIS — O99.212 OBESITY DURING PREGNANCY IN SECOND TRIMESTER: Primary | ICD-10-CM

## 2022-06-27 PROCEDURE — 99999 PR PBB SHADOW E&M-EST. PATIENT-LVL II: CPT | Mod: PBBFAC,,, | Performed by: ADVANCED PRACTICE MIDWIFE

## 2022-06-27 PROCEDURE — 76819 US OB/GYN PROCEDURE (VIEWPOINT): ICD-10-PCS | Mod: S$GLB,,, | Performed by: OBSTETRICS & GYNECOLOGY

## 2022-06-27 PROCEDURE — 76816 OB US FOLLOW-UP PER FETUS: CPT | Mod: S$GLB,,, | Performed by: OBSTETRICS & GYNECOLOGY

## 2022-06-27 PROCEDURE — 0502F SUBSEQUENT PRENATAL CARE: CPT | Mod: CPTII,S$GLB,, | Performed by: ADVANCED PRACTICE MIDWIFE

## 2022-06-27 PROCEDURE — 76819 FETAL BIOPHYS PROFIL W/O NST: CPT | Mod: S$GLB,,, | Performed by: OBSTETRICS & GYNECOLOGY

## 2022-06-27 PROCEDURE — 0502F PR SUBSEQUENT PRENATAL CARE: ICD-10-PCS | Mod: CPTII,S$GLB,, | Performed by: ADVANCED PRACTICE MIDWIFE

## 2022-06-27 PROCEDURE — 76816 US OB/GYN PROCEDURE (VIEWPOINT): ICD-10-PCS | Mod: S$GLB,,, | Performed by: OBSTETRICS & GYNECOLOGY

## 2022-06-27 PROCEDURE — 99999 PR PBB SHADOW E&M-EST. PATIENT-LVL II: ICD-10-PCS | Mod: PBBFAC,,, | Performed by: ADVANCED PRACTICE MIDWIFE

## 2022-06-27 NOTE — PROGRESS NOTES
27 y.o. female  at 32w6d   Reports + FM, denies VB, LOF or regular CTX  Finally had a large BM after 5 enemas and Mg citrate this morning. Feels so much better. Discussed ways to prevent constipation: prune juice, increase water, daily stool softeners, benefiber, increase fiber rich foods in diet.     TW lbs   US today: cephalic, EFW 32%tile, BPP 8/8 reassuring with normal MVP6.0cm JENNIFER 11.5cm.  Continue weekly BPPs    Reviewed warning signs, normal FKCs, labor precautions and how/when to call.  RTC x 1 week, call or present sooner prn.     I spent a total of 20 minutes on the day of the visit.This includes face to face time and non-face to face time preparing to see the patient (eg, review of tests), Obtaining and/or reviewing separately obtained history, Documenting clinical information in the electronic or other health record, Independently interpreting resultsand communicating results to the patient/family/caregiver, or Care coordination.

## 2022-07-01 ENCOUNTER — ROUTINE PRENATAL (OUTPATIENT)
Dept: OBSTETRICS AND GYNECOLOGY | Facility: CLINIC | Age: 27
End: 2022-07-01
Payer: COMMERCIAL

## 2022-07-01 VITALS
SYSTOLIC BLOOD PRESSURE: 114 MMHG | WEIGHT: 247.56 LBS | BODY MASS INDEX: 45.28 KG/M2 | DIASTOLIC BLOOD PRESSURE: 60 MMHG

## 2022-07-01 DIAGNOSIS — O99.212 OBESITY DURING PREGNANCY IN SECOND TRIMESTER: Primary | ICD-10-CM

## 2022-07-01 PROCEDURE — 99999 PR PBB SHADOW E&M-EST. PATIENT-LVL II: ICD-10-PCS | Mod: PBBFAC,,, | Performed by: ADVANCED PRACTICE MIDWIFE

## 2022-07-01 PROCEDURE — 0502F PR SUBSEQUENT PRENATAL CARE: ICD-10-PCS | Mod: CPTII,S$GLB,, | Performed by: ADVANCED PRACTICE MIDWIFE

## 2022-07-01 PROCEDURE — 0502F SUBSEQUENT PRENATAL CARE: CPT | Mod: CPTII,S$GLB,, | Performed by: ADVANCED PRACTICE MIDWIFE

## 2022-07-01 PROCEDURE — 99999 PR PBB SHADOW E&M-EST. PATIENT-LVL II: CPT | Mod: PBBFAC,,, | Performed by: ADVANCED PRACTICE MIDWIFE

## 2022-07-01 NOTE — PROGRESS NOTES
27 y.o. female  at 33w3d   Reports + FM, denies VB, LOF or CTX  Doing well without concerns.  TW lbs   Reviewed 28wk lab results (B POS)  US shows BPP 8/8, MVP 7, cephalic presentation.   Reviewed warning signs, normal FKCs,  labor precautions and how/when to call.  RTC x 1 wks, call or present sooner prn.     I spent a total of 20 minutes on the day of the visit.This includes face to face time and non-face to face time preparing to see the patient (eg, review of tests), Obtaining and/or reviewing separately obtained history, Documenting clinical information in the electronic or other health record, Independently interpreting resultsand communicating results to the patient/family/caregiver, or Care coordination.

## 2022-07-11 ENCOUNTER — ROUTINE PRENATAL (OUTPATIENT)
Dept: OBSTETRICS AND GYNECOLOGY | Facility: CLINIC | Age: 27
End: 2022-07-11
Payer: COMMERCIAL

## 2022-07-11 VITALS
DIASTOLIC BLOOD PRESSURE: 70 MMHG | BODY MASS INDEX: 46.01 KG/M2 | WEIGHT: 251.56 LBS | SYSTOLIC BLOOD PRESSURE: 130 MMHG

## 2022-07-11 DIAGNOSIS — Z3A.34 34 WEEKS GESTATION OF PREGNANCY: Primary | ICD-10-CM

## 2022-07-11 PROCEDURE — 0502F PR SUBSEQUENT PRENATAL CARE: ICD-10-PCS | Mod: CPTII,S$GLB,,

## 2022-07-11 PROCEDURE — 0502F SUBSEQUENT PRENATAL CARE: CPT | Mod: CPTII,S$GLB,,

## 2022-07-11 PROCEDURE — 99999 PR PBB SHADOW E&M-EST. PATIENT-LVL II: ICD-10-PCS | Mod: PBBFAC,,,

## 2022-07-11 PROCEDURE — 99999 PR PBB SHADOW E&M-EST. PATIENT-LVL II: CPT | Mod: PBBFAC,,,

## 2022-07-11 NOTE — PROGRESS NOTES
"27 y.o. female  at 34w6d   Reports + FM, denies VB, LOF or CTX  Doing ok.  Patient feeling relief after having large bowel movement.  Is having some diarrhea and gas "that smells rotten per "  Patient  also states that her sweat "smells like poo so her body must be absorbing the fecal matter".  Advised to increase fiber in diet and stay hydrated.    Patient discussed stressors in life. Advised to minimize stressors if possible.  Patient verbalized understanding   TW lbs   Tdap 22  US reviewed: , vertex, anterior placenta, JENNIFER WNL, MVP 6.7cm, BPP 8/8, continue weekly BPP for BMI  GBS handout given and reviewed  Reviewed warning signs, normal FKCs,  labor precautions and how/when to call. Patient states understanding.  RTC x 1 wk, call or present sooner prn.     I spent a total of 20 minutes on the day of the visit.This includes face to face time and non-face to face time preparing to see the patient (eg, review of tests), Obtaining and/or reviewing separately obtained history, Documenting clinical information in the electronic or other health record, Independently interpreting resultsand communicating results to the patient/family/caregiver, or Care coordination.       "

## 2022-07-18 ENCOUNTER — ROUTINE PRENATAL (OUTPATIENT)
Dept: OBSTETRICS AND GYNECOLOGY | Facility: CLINIC | Age: 27
End: 2022-07-18
Payer: COMMERCIAL

## 2022-07-18 ENCOUNTER — PROCEDURE VISIT (OUTPATIENT)
Dept: OBSTETRICS AND GYNECOLOGY | Facility: CLINIC | Age: 27
End: 2022-07-18
Payer: COMMERCIAL

## 2022-07-18 VITALS
SYSTOLIC BLOOD PRESSURE: 118 MMHG | WEIGHT: 254.19 LBS | BODY MASS INDEX: 46.49 KG/M2 | DIASTOLIC BLOOD PRESSURE: 72 MMHG

## 2022-07-18 DIAGNOSIS — O99.212 OBESITY DURING PREGNANCY IN SECOND TRIMESTER: ICD-10-CM

## 2022-07-18 DIAGNOSIS — Z3A.35 35 WEEKS GESTATION OF PREGNANCY: Primary | ICD-10-CM

## 2022-07-18 PROCEDURE — 76816 US OB/GYN PROCEDURE (VIEWPOINT): ICD-10-PCS | Mod: S$GLB,,, | Performed by: OBSTETRICS & GYNECOLOGY

## 2022-07-18 PROCEDURE — 76819 US OB/GYN PROCEDURE (VIEWPOINT): ICD-10-PCS | Mod: S$GLB,,, | Performed by: OBSTETRICS & GYNECOLOGY

## 2022-07-18 PROCEDURE — 99999 PR PBB SHADOW E&M-EST. PATIENT-LVL II: CPT | Mod: PBBFAC,,,

## 2022-07-18 PROCEDURE — 76816 OB US FOLLOW-UP PER FETUS: CPT | Mod: S$GLB,,, | Performed by: OBSTETRICS & GYNECOLOGY

## 2022-07-18 PROCEDURE — 0502F PR SUBSEQUENT PRENATAL CARE: ICD-10-PCS | Mod: CPTII,S$GLB,,

## 2022-07-18 PROCEDURE — 99999 PR PBB SHADOW E&M-EST. PATIENT-LVL II: ICD-10-PCS | Mod: PBBFAC,,,

## 2022-07-18 PROCEDURE — 0502F SUBSEQUENT PRENATAL CARE: CPT | Mod: CPTII,S$GLB,,

## 2022-07-18 PROCEDURE — 76819 FETAL BIOPHYS PROFIL W/O NST: CPT | Mod: S$GLB,,, | Performed by: OBSTETRICS & GYNECOLOGY

## 2022-07-18 NOTE — PROGRESS NOTES
27 y.o. female  at 35w6d   Reports + FM, denies VB, LOF or CTX  Doing well without concerns. Patient state family issues are getting better and is feeling less stressed.    TW lbs   Tdap 22  US reviewed: , vertex, anterior placenta, 3VC, JENNIFER WNL, MVP 6.0cm, EFW 2819g (6lb 3oz), 39%ile, AC 82%ile, BPP 8/, continue weekly BPP for BMI  GBS handout given and reviewed    Reviewed warning signs, normal FKCs,  labor precautions and how/when to call. Patient states understanding.  RTC x 1 wk, call or present sooner prn.     I spent a total of 20 minutes on the day of the visit.This includes face to face time and non-face to face time preparing to see the patient (eg, review of tests), Obtaining and/or reviewing separately obtained history, Documenting clinical information in the electronic or other health record, Independently interpreting resultsand communicating results to the patient/family/caregiver, or Care coordination.

## 2022-07-25 ENCOUNTER — ROUTINE PRENATAL (OUTPATIENT)
Dept: OBSTETRICS AND GYNECOLOGY | Facility: CLINIC | Age: 27
End: 2022-07-25
Payer: COMMERCIAL

## 2022-07-25 ENCOUNTER — PROCEDURE VISIT (OUTPATIENT)
Dept: OBSTETRICS AND GYNECOLOGY | Facility: CLINIC | Age: 27
End: 2022-07-25
Payer: COMMERCIAL

## 2022-07-25 VITALS — SYSTOLIC BLOOD PRESSURE: 120 MMHG | BODY MASS INDEX: 45.6 KG/M2 | WEIGHT: 249.31 LBS | DIASTOLIC BLOOD PRESSURE: 60 MMHG

## 2022-07-25 DIAGNOSIS — O99.212 OBESITY DURING PREGNANCY IN SECOND TRIMESTER: ICD-10-CM

## 2022-07-25 DIAGNOSIS — O99.213 OBESITY DURING PREGNANCY IN THIRD TRIMESTER: Primary | ICD-10-CM

## 2022-07-25 PROCEDURE — 0502F SUBSEQUENT PRENATAL CARE: CPT | Mod: CPTII,S$GLB,, | Performed by: ADVANCED PRACTICE MIDWIFE

## 2022-07-25 PROCEDURE — 76819 US OB/GYN PROCEDURE (VIEWPOINT): ICD-10-PCS | Mod: S$GLB,,, | Performed by: OBSTETRICS & GYNECOLOGY

## 2022-07-25 PROCEDURE — 99999 PR PBB SHADOW E&M-EST. PATIENT-LVL II: ICD-10-PCS | Mod: PBBFAC,,, | Performed by: ADVANCED PRACTICE MIDWIFE

## 2022-07-25 PROCEDURE — 76819 FETAL BIOPHYS PROFIL W/O NST: CPT | Mod: S$GLB,,, | Performed by: OBSTETRICS & GYNECOLOGY

## 2022-07-25 PROCEDURE — 99999 PR PBB SHADOW E&M-EST. PATIENT-LVL II: CPT | Mod: PBBFAC,,, | Performed by: ADVANCED PRACTICE MIDWIFE

## 2022-07-25 PROCEDURE — 0502F PR SUBSEQUENT PRENATAL CARE: ICD-10-PCS | Mod: CPTII,S$GLB,, | Performed by: ADVANCED PRACTICE MIDWIFE

## 2022-07-25 PROCEDURE — 87081 CULTURE SCREEN ONLY: CPT | Performed by: ADVANCED PRACTICE MIDWIFE

## 2022-07-25 NOTE — PROGRESS NOTES
27 y.o. female  at 36w6d  Reports + FM, denies VB, LOF or regular CTX  Doing well without concerns     TW lbs   GBS collected today   The skin of the suprapubic region was evaluated and appears intact.    VE per pt request C/T/H    US today: BPP 8/8 reassuring with normal MVP 6.6cm. cephalic.     Reviewed warning signs, normal FKCs, labor precautions and how/when to call.  RTC x 1 wk with US, call or present sooner prn.     I spent a total of 20 minutes on the day of the visit.This includes face to face time and non-face to face time preparing to see the patient (eg, review of tests), Obtaining and/or reviewing separately obtained history, Documenting clinical information in the electronic or other health record, Independently interpreting results and communicating results to the patient/family/caregiver, or Care coordination.

## 2022-07-28 LAB — BACTERIA SPEC AEROBE CULT: NORMAL

## 2022-08-04 ENCOUNTER — ROUTINE PRENATAL (OUTPATIENT)
Dept: OBSTETRICS AND GYNECOLOGY | Facility: CLINIC | Age: 27
End: 2022-08-04
Payer: COMMERCIAL

## 2022-08-04 ENCOUNTER — PROCEDURE VISIT (OUTPATIENT)
Dept: OBSTETRICS AND GYNECOLOGY | Facility: CLINIC | Age: 27
End: 2022-08-04
Payer: COMMERCIAL

## 2022-08-04 VITALS
SYSTOLIC BLOOD PRESSURE: 116 MMHG | DIASTOLIC BLOOD PRESSURE: 74 MMHG | WEIGHT: 254.19 LBS | BODY MASS INDEX: 46.49 KG/M2

## 2022-08-04 DIAGNOSIS — O99.213 OBESITY DURING PREGNANCY IN THIRD TRIMESTER: Primary | ICD-10-CM

## 2022-08-04 DIAGNOSIS — O99.212 OBESITY DURING PREGNANCY IN SECOND TRIMESTER: ICD-10-CM

## 2022-08-04 PROCEDURE — 99999 PR PBB SHADOW E&M-EST. PATIENT-LVL III: CPT | Mod: PBBFAC,,, | Performed by: ADVANCED PRACTICE MIDWIFE

## 2022-08-04 PROCEDURE — 99999 PR PBB SHADOW E&M-EST. PATIENT-LVL III: ICD-10-PCS | Mod: PBBFAC,,, | Performed by: ADVANCED PRACTICE MIDWIFE

## 2022-08-04 PROCEDURE — 0502F SUBSEQUENT PRENATAL CARE: CPT | Mod: CPTII,S$GLB,, | Performed by: ADVANCED PRACTICE MIDWIFE

## 2022-08-04 PROCEDURE — 0502F PR SUBSEQUENT PRENATAL CARE: ICD-10-PCS | Mod: CPTII,S$GLB,, | Performed by: ADVANCED PRACTICE MIDWIFE

## 2022-08-04 NOTE — PROGRESS NOTES
27 y.o. female  at 38w2d   Reports + FM, denies VB, LOF or regular CTX  Doing well without concerns     Declines BPP today due to insurance not covering US in full. Discussed recommendation for weekly BPP due to obesity. V/u.    TW lbs   VE 0/50/-3    Discussed miles circuit  Reviewed warning signs, normal FKCs, labor precautions and how/when to call.  RTC x 1 wk, call or present sooner prn.     I spent a total of 20 minutes on the day of the visit.This includes face to face time and non-face to face time preparing to see the patient (eg, review of tests), Obtaining and/or reviewing separately obtained history, Documenting clinical information in the electronic or other health record, Independently interpreting resultsand communicating results to the patient/family/caregiver, or Care coordination.

## 2022-08-08 ENCOUNTER — PATIENT MESSAGE (OUTPATIENT)
Dept: OBSTETRICS AND GYNECOLOGY | Facility: CLINIC | Age: 27
End: 2022-08-08

## 2022-08-08 ENCOUNTER — ROUTINE PRENATAL (OUTPATIENT)
Dept: OBSTETRICS AND GYNECOLOGY | Facility: CLINIC | Age: 27
End: 2022-08-08
Payer: COMMERCIAL

## 2022-08-08 VITALS
WEIGHT: 251.75 LBS | DIASTOLIC BLOOD PRESSURE: 70 MMHG | SYSTOLIC BLOOD PRESSURE: 118 MMHG | BODY MASS INDEX: 46.05 KG/M2

## 2022-08-08 DIAGNOSIS — Z3A.38 38 WEEKS GESTATION OF PREGNANCY: Primary | ICD-10-CM

## 2022-08-08 PROCEDURE — 99999 PR PBB SHADOW E&M-EST. PATIENT-LVL II: ICD-10-PCS | Mod: PBBFAC,,,

## 2022-08-08 PROCEDURE — 0502F PR SUBSEQUENT PRENATAL CARE: ICD-10-PCS | Mod: CPTII,S$GLB,,

## 2022-08-08 PROCEDURE — 0502F SUBSEQUENT PRENATAL CARE: CPT | Mod: CPTII,S$GLB,,

## 2022-08-08 PROCEDURE — 99999 PR PBB SHADOW E&M-EST. PATIENT-LVL II: CPT | Mod: PBBFAC,,,

## 2022-08-08 NOTE — PROGRESS NOTES
27 y.o. female  at 38w6d   Reports + FM, denies VB, LOF or regular CTX, some BH  Doing well. C/o N/V/D x1 day.  Denies fever/chills/malaise. Thinks labor may be starting soon.   Will think about getting VE NV if still pregnant  TW lbs   GBS: Negative  Reviewed warning signs, normal FKCs, labor precautions and how/when to call. Patient states understanding.  RTC x 1 wks, call or present sooner prn.     I spent a total of 20 minutes on the day of the visit.This includes face to face time and non-face to face time preparing to see the patient (eg, review of tests), Obtaining and/or reviewing separately obtained history, Documenting clinical information in the electronic or other health record, Independently interpreting resultsand communicating results to the patient/family/caregiver, or Care coordination.

## 2022-08-15 ENCOUNTER — ROUTINE PRENATAL (OUTPATIENT)
Dept: OBSTETRICS AND GYNECOLOGY | Facility: CLINIC | Age: 27
End: 2022-08-15
Payer: COMMERCIAL

## 2022-08-15 VITALS
SYSTOLIC BLOOD PRESSURE: 122 MMHG | WEIGHT: 257.69 LBS | BODY MASS INDEX: 47.14 KG/M2 | DIASTOLIC BLOOD PRESSURE: 74 MMHG

## 2022-08-15 DIAGNOSIS — O48.0 POST-TERM PREGNANCY, 40-42 WEEKS OF GESTATION: ICD-10-CM

## 2022-08-15 DIAGNOSIS — O99.213 OBESITY IN PREGNANCY, ANTEPARTUM, THIRD TRIMESTER: Primary | ICD-10-CM

## 2022-08-15 PROCEDURE — 0502F PR SUBSEQUENT PRENATAL CARE: ICD-10-PCS | Mod: CPTII,S$GLB,, | Performed by: ADVANCED PRACTICE MIDWIFE

## 2022-08-15 PROCEDURE — 0502F SUBSEQUENT PRENATAL CARE: CPT | Mod: CPTII,S$GLB,, | Performed by: ADVANCED PRACTICE MIDWIFE

## 2022-08-15 PROCEDURE — 99999 PR PBB SHADOW E&M-EST. PATIENT-LVL II: CPT | Mod: PBBFAC,,, | Performed by: ADVANCED PRACTICE MIDWIFE

## 2022-08-15 PROCEDURE — 99999 PR PBB SHADOW E&M-EST. PATIENT-LVL II: ICD-10-PCS | Mod: PBBFAC,,, | Performed by: ADVANCED PRACTICE MIDWIFE

## 2022-08-15 RX ORDER — MUPIROCIN 20 MG/G
OINTMENT TOPICAL
Status: CANCELLED | OUTPATIENT
Start: 2022-08-15

## 2022-08-15 RX ORDER — CARBOPROST TROMETHAMINE 250 UG/ML
250 INJECTION, SOLUTION INTRAMUSCULAR
Status: CANCELLED | OUTPATIENT
Start: 2022-08-15

## 2022-08-15 RX ORDER — TERBUTALINE SULFATE 1 MG/ML
0.25 INJECTION SUBCUTANEOUS
Status: CANCELLED | OUTPATIENT
Start: 2022-08-15

## 2022-08-15 RX ORDER — SIMETHICONE 80 MG
1 TABLET,CHEWABLE ORAL 4 TIMES DAILY PRN
Status: CANCELLED | OUTPATIENT
Start: 2022-08-15

## 2022-08-15 RX ORDER — MISOPROSTOL 100 UG/1
800 TABLET ORAL
Status: CANCELLED | OUTPATIENT
Start: 2022-08-15

## 2022-08-15 RX ORDER — MISOPROSTOL 100 MCG
25 TABLET ORAL EVERY 6 HOURS
Status: CANCELLED | OUTPATIENT
Start: 2022-08-15 | End: 2022-08-16

## 2022-08-15 RX ORDER — METHYLERGONOVINE MALEATE 0.2 MG/ML
200 INJECTION INTRAVENOUS
Status: CANCELLED | OUTPATIENT
Start: 2022-08-15

## 2022-08-15 RX ORDER — ONDANSETRON 4 MG/1
8 TABLET, ORALLY DISINTEGRATING ORAL EVERY 8 HOURS PRN
Status: CANCELLED | OUTPATIENT
Start: 2022-08-15

## 2022-08-15 RX ORDER — OXYTOCIN/RINGER'S LACTATE 30/500 ML
334 PLASTIC BAG, INJECTION (ML) INTRAVENOUS ONCE
Status: CANCELLED | OUTPATIENT
Start: 2022-08-15 | End: 2022-08-15

## 2022-08-15 RX ORDER — OXYTOCIN/RINGER'S LACTATE 30/500 ML
95 PLASTIC BAG, INJECTION (ML) INTRAVENOUS ONCE
Status: CANCELLED | OUTPATIENT
Start: 2022-08-15 | End: 2022-08-15

## 2022-08-15 RX ORDER — DIPHENOXYLATE HYDROCHLORIDE AND ATROPINE SULFATE 2.5; .025 MG/1; MG/1
1 TABLET ORAL 4 TIMES DAILY PRN
Status: CANCELLED | OUTPATIENT
Start: 2022-08-15

## 2022-08-15 RX ORDER — SODIUM CHLORIDE, SODIUM LACTATE, POTASSIUM CHLORIDE, CALCIUM CHLORIDE 600; 310; 30; 20 MG/100ML; MG/100ML; MG/100ML; MG/100ML
INJECTION, SOLUTION INTRAVENOUS CONTINUOUS
Status: CANCELLED | OUTPATIENT
Start: 2022-08-15

## 2022-08-15 RX ORDER — PROCHLORPERAZINE EDISYLATE 5 MG/ML
5 INJECTION INTRAMUSCULAR; INTRAVENOUS EVERY 6 HOURS PRN
Status: CANCELLED | OUTPATIENT
Start: 2022-08-15

## 2022-08-15 RX ORDER — CALCIUM CARBONATE 200(500)MG
500 TABLET,CHEWABLE ORAL 3 TIMES DAILY PRN
Status: CANCELLED | OUTPATIENT
Start: 2022-08-15

## 2022-08-15 RX ORDER — LIDOCAINE HYDROCHLORIDE 10 MG/ML
10 INJECTION INFILTRATION; PERINEURAL ONCE AS NEEDED
Status: CANCELLED | OUTPATIENT
Start: 2022-08-15 | End: 2034-01-11

## 2022-08-15 NOTE — PROGRESS NOTES
27 y.o. female  at 39w6d   Reports + FM, denies VB, LOF or regular CTX  Doing well without concerns. Ready for baby.   TW lbs   Reviewed GBS - and repeat HIV/RPR -/-  Discussed postdates management and IOL  BPPs discussed and ordered  Scheduled IOL at 41w0d on   Reviewed warning signs, normal FKCs, labor precautions and how/when to call.  RTC x 1 wk, call or present sooner prn.

## 2022-08-22 ENCOUNTER — ROUTINE PRENATAL (OUTPATIENT)
Dept: OBSTETRICS AND GYNECOLOGY | Facility: CLINIC | Age: 27
End: 2022-08-22
Payer: COMMERCIAL

## 2022-08-22 ENCOUNTER — PROCEDURE VISIT (OUTPATIENT)
Dept: OBSTETRICS AND GYNECOLOGY | Facility: CLINIC | Age: 27
End: 2022-08-22
Payer: COMMERCIAL

## 2022-08-22 VITALS
SYSTOLIC BLOOD PRESSURE: 116 MMHG | DIASTOLIC BLOOD PRESSURE: 82 MMHG | WEIGHT: 255.94 LBS | BODY MASS INDEX: 46.81 KG/M2

## 2022-08-22 DIAGNOSIS — Z3A.40 40 WEEKS GESTATION OF PREGNANCY: Primary | ICD-10-CM

## 2022-08-22 DIAGNOSIS — O48.0 POST-TERM PREGNANCY, 40-42 WEEKS OF GESTATION: ICD-10-CM

## 2022-08-22 PROCEDURE — 76819 US OB/GYN PROCEDURE (VIEWPOINT): ICD-10-PCS | Mod: S$GLB,,, | Performed by: OBSTETRICS & GYNECOLOGY

## 2022-08-22 PROCEDURE — 99999 PR PBB SHADOW E&M-EST. PATIENT-LVL II: ICD-10-PCS | Mod: PBBFAC,,,

## 2022-08-22 PROCEDURE — 99999 PR PBB SHADOW E&M-EST. PATIENT-LVL II: CPT | Mod: PBBFAC,,,

## 2022-08-22 PROCEDURE — 76819 FETAL BIOPHYS PROFIL W/O NST: CPT | Mod: S$GLB,,, | Performed by: OBSTETRICS & GYNECOLOGY

## 2022-08-22 PROCEDURE — 0502F SUBSEQUENT PRENATAL CARE: CPT | Mod: CPTII,S$GLB,,

## 2022-08-22 PROCEDURE — 0502F PR SUBSEQUENT PRENATAL CARE: ICD-10-PCS | Mod: CPTII,S$GLB,,

## 2022-08-23 NOTE — PROGRESS NOTES
27 y.o. female  at 40w6d   Reports + FM, denies VB, LOF or regular CTX  Doing well. Ready to have baby. IOL set for  @1700  VE per patient request: posterior/soft/FT//-4  US reviewed: , vertex, anterior placenta, JENNIFER 10 cm, MVP 4.1cm, BPP 8/8  TW lbs   GBS: Negative  Reviewed warning signs, normal FKCs, labor precautions and how/when to call. Patient states understanding.     I spent a total of 20 minutes on the day of the visit.This includes face to face time and non-face to face time preparing to see the patient (eg, review of tests), Obtaining and/or reviewing separately obtained history, Documenting clinical information in the electronic or other health record, Independently interpreting resultsand communicating results to the patient/family/caregiver, or Care coordination.

## 2022-08-24 ENCOUNTER — ANESTHESIA (OUTPATIENT)
Dept: OBSTETRICS AND GYNECOLOGY | Facility: HOSPITAL | Age: 27
End: 2022-08-24
Payer: COMMERCIAL

## 2022-08-24 ENCOUNTER — ANESTHESIA EVENT (OUTPATIENT)
Dept: OBSTETRICS AND GYNECOLOGY | Facility: HOSPITAL | Age: 27
End: 2022-08-24
Payer: COMMERCIAL

## 2022-08-24 ENCOUNTER — HOSPITAL ENCOUNTER (INPATIENT)
Facility: HOSPITAL | Age: 27
LOS: 3 days | Discharge: HOME | End: 2022-08-27
Attending: OBSTETRICS & GYNECOLOGY | Admitting: OBSTETRICS & GYNECOLOGY
Payer: COMMERCIAL

## 2022-08-24 DIAGNOSIS — O48.0 POST-TERM PREGNANCY, 40-42 WEEKS OF GESTATION: ICD-10-CM

## 2022-08-24 LAB
ABO + RH BLD: NORMAL
BASOPHILS # BLD AUTO: 0.04 K/UL (ref 0–0.2)
BASOPHILS NFR BLD: 0.3 % (ref 0–1.9)
BLD GP AB SCN CELLS X3 SERPL QL: NORMAL
DIFFERENTIAL METHOD: ABNORMAL
EOSINOPHIL # BLD AUTO: 0.1 K/UL (ref 0–0.5)
EOSINOPHIL NFR BLD: 1.1 % (ref 0–8)
ERYTHROCYTE [DISTWIDTH] IN BLOOD BY AUTOMATED COUNT: 14.1 % (ref 11.5–14.5)
HCT VFR BLD AUTO: 32.8 % (ref 37–48.5)
HGB BLD-MCNC: 9.9 G/DL (ref 12–16)
IMM GRANULOCYTES # BLD AUTO: 0.18 K/UL (ref 0–0.04)
IMM GRANULOCYTES NFR BLD AUTO: 1.5 % (ref 0–0.5)
LYMPHOCYTES # BLD AUTO: 2.4 K/UL (ref 1–4.8)
LYMPHOCYTES NFR BLD: 20.6 % (ref 18–48)
MCH RBC QN AUTO: 25.3 PG (ref 27–31)
MCHC RBC AUTO-ENTMCNC: 30.2 G/DL (ref 32–36)
MCV RBC AUTO: 84 FL (ref 82–98)
MONOCYTES # BLD AUTO: 1.8 K/UL (ref 0.3–1)
MONOCYTES NFR BLD: 15.4 % (ref 4–15)
NEUTROPHILS # BLD AUTO: 7.2 K/UL (ref 1.8–7.7)
NEUTROPHILS NFR BLD: 61.1 % (ref 38–73)
NRBC BLD-RTO: 0 /100 WBC
PLATELET # BLD AUTO: 197 K/UL (ref 150–450)
PMV BLD AUTO: 12.2 FL (ref 9.2–12.9)
RBC # BLD AUTO: 3.92 M/UL (ref 4–5.4)
WBC # BLD AUTO: 11.72 K/UL (ref 3.9–12.7)

## 2022-08-24 PROCEDURE — 25000003 PHARM REV CODE 250: Performed by: OBSTETRICS & GYNECOLOGY

## 2022-08-24 PROCEDURE — 27200710 HC EPIDURAL INFUSION PUMP SET: Performed by: ANESTHESIOLOGY

## 2022-08-24 PROCEDURE — 25000003 PHARM REV CODE 250: Performed by: ADVANCED PRACTICE MIDWIFE

## 2022-08-24 PROCEDURE — 51701 INSERT BLADDER CATHETER: CPT

## 2022-08-24 PROCEDURE — 72100002 HC LABOR CARE, 1ST 8 HOURS

## 2022-08-24 PROCEDURE — 72100003 HC LABOR CARE, EA. ADDL. 8 HRS

## 2022-08-24 PROCEDURE — 63600175 PHARM REV CODE 636 W HCPCS: Performed by: NURSE ANESTHETIST, CERTIFIED REGISTERED

## 2022-08-24 PROCEDURE — 27800516 HC TRAY, EPIDURAL COMBO: Performed by: ANESTHESIOLOGY

## 2022-08-24 PROCEDURE — 62326 NJX INTERLAMINAR LMBR/SAC: CPT | Performed by: NURSE ANESTHETIST, CERTIFIED REGISTERED

## 2022-08-24 PROCEDURE — 25000003 PHARM REV CODE 250: Performed by: NURSE ANESTHETIST, CERTIFIED REGISTERED

## 2022-08-24 PROCEDURE — 86901 BLOOD TYPING SEROLOGIC RH(D): CPT | Performed by: ADVANCED PRACTICE MIDWIFE

## 2022-08-24 PROCEDURE — 11000001 HC ACUTE MED/SURG PRIVATE ROOM

## 2022-08-24 PROCEDURE — 63600175 PHARM REV CODE 636 W HCPCS: Performed by: ADVANCED PRACTICE MIDWIFE

## 2022-08-24 PROCEDURE — 63600175 PHARM REV CODE 636 W HCPCS: Performed by: ANESTHESIOLOGY

## 2022-08-24 PROCEDURE — 85025 COMPLETE CBC W/AUTO DIFF WBC: CPT | Performed by: ADVANCED PRACTICE MIDWIFE

## 2022-08-24 RX ORDER — ROPIVACAINE HYDROCHLORIDE 2 MG/ML
INJECTION, SOLUTION EPIDURAL; INFILTRATION; PERINEURAL
Status: DISCONTINUED | OUTPATIENT
Start: 2022-08-24 | End: 2022-08-25

## 2022-08-24 RX ORDER — METHYLERGONOVINE MALEATE 0.2 MG/ML
200 INJECTION INTRAVENOUS
Status: DISCONTINUED | OUTPATIENT
Start: 2022-08-24 | End: 2022-08-25

## 2022-08-24 RX ORDER — SODIUM CHLORIDE, SODIUM LACTATE, POTASSIUM CHLORIDE, CALCIUM CHLORIDE 600; 310; 30; 20 MG/100ML; MG/100ML; MG/100ML; MG/100ML
INJECTION, SOLUTION INTRAVENOUS CONTINUOUS
Status: DISCONTINUED | OUTPATIENT
Start: 2022-08-24 | End: 2022-08-25

## 2022-08-24 RX ORDER — DIPHENOXYLATE HYDROCHLORIDE AND ATROPINE SULFATE 2.5; .025 MG/1; MG/1
1 TABLET ORAL 4 TIMES DAILY PRN
Status: DISCONTINUED | OUTPATIENT
Start: 2022-08-24 | End: 2022-08-25

## 2022-08-24 RX ORDER — PROCHLORPERAZINE EDISYLATE 5 MG/ML
5 INJECTION INTRAMUSCULAR; INTRAVENOUS EVERY 6 HOURS PRN
Status: DISCONTINUED | OUTPATIENT
Start: 2022-08-24 | End: 2022-08-25

## 2022-08-24 RX ORDER — MISOPROSTOL 100 MCG
50 TABLET ORAL EVERY 6 HOURS
Status: DISCONTINUED | OUTPATIENT
Start: 2022-08-24 | End: 2022-08-24

## 2022-08-24 RX ORDER — OXYTOCIN/RINGER'S LACTATE 30/500 ML
0-30 PLASTIC BAG, INJECTION (ML) INTRAVENOUS CONTINUOUS
Status: DISCONTINUED | OUTPATIENT
Start: 2022-08-24 | End: 2022-08-25

## 2022-08-24 RX ORDER — MISOPROSTOL 100 MCG
25 TABLET ORAL EVERY 4 HOURS
Status: DISCONTINUED | OUTPATIENT
Start: 2022-08-24 | End: 2022-08-24

## 2022-08-24 RX ORDER — TRANEXAMIC ACID 100 MG/ML
1000 INJECTION, SOLUTION INTRAVENOUS ONCE AS NEEDED
Status: DISCONTINUED | OUTPATIENT
Start: 2022-08-24 | End: 2022-08-25

## 2022-08-24 RX ORDER — CARBOPROST TROMETHAMINE 250 UG/ML
250 INJECTION, SOLUTION INTRAMUSCULAR
Status: DISCONTINUED | OUTPATIENT
Start: 2022-08-24 | End: 2022-08-25

## 2022-08-24 RX ORDER — LIDOCAINE HYDROCHLORIDE AND EPINEPHRINE 15; 5 MG/ML; UG/ML
INJECTION, SOLUTION EPIDURAL
Status: DISCONTINUED | OUTPATIENT
Start: 2022-08-24 | End: 2022-08-25

## 2022-08-24 RX ORDER — OXYTOCIN/RINGER'S LACTATE 30/500 ML
95 PLASTIC BAG, INJECTION (ML) INTRAVENOUS ONCE
Status: DISCONTINUED | OUTPATIENT
Start: 2022-08-24 | End: 2022-08-25

## 2022-08-24 RX ORDER — OXYTOCIN/RINGER'S LACTATE 30/500 ML
334 PLASTIC BAG, INJECTION (ML) INTRAVENOUS ONCE
Status: DISCONTINUED | OUTPATIENT
Start: 2022-08-24 | End: 2022-08-25

## 2022-08-24 RX ORDER — MISOPROSTOL 200 UG/1
800 TABLET ORAL
Status: DISCONTINUED | OUTPATIENT
Start: 2022-08-24 | End: 2022-08-25

## 2022-08-24 RX ORDER — TERBUTALINE SULFATE 1 MG/ML
0.25 INJECTION SUBCUTANEOUS
Status: DISCONTINUED | OUTPATIENT
Start: 2022-08-24 | End: 2022-08-25

## 2022-08-24 RX ORDER — LIDOCAINE HYDROCHLORIDE 10 MG/ML
10 INJECTION, SOLUTION EPIDURAL; INFILTRATION; INTRACAUDAL; PERINEURAL ONCE AS NEEDED
Status: COMPLETED | OUTPATIENT
Start: 2022-08-24 | End: 2022-08-25

## 2022-08-24 RX ORDER — DIPHENHYDRAMINE HYDROCHLORIDE 50 MG/ML
12.5 INJECTION INTRAMUSCULAR; INTRAVENOUS EVERY 4 HOURS PRN
Status: DISCONTINUED | OUTPATIENT
Start: 2022-08-24 | End: 2022-08-25

## 2022-08-24 RX ORDER — MUPIROCIN 20 MG/G
OINTMENT TOPICAL
Status: DISCONTINUED | OUTPATIENT
Start: 2022-08-24 | End: 2022-08-25

## 2022-08-24 RX ORDER — ROPIVACAINE HYDROCHLORIDE 2 MG/ML
12 INJECTION, SOLUTION EPIDURAL; INFILTRATION CONTINUOUS
Status: DISCONTINUED | OUTPATIENT
Start: 2022-08-24 | End: 2022-08-25

## 2022-08-24 RX ORDER — SIMETHICONE 80 MG
1 TABLET,CHEWABLE ORAL 4 TIMES DAILY PRN
Status: DISCONTINUED | OUTPATIENT
Start: 2022-08-24 | End: 2022-08-25

## 2022-08-24 RX ORDER — ONDANSETRON 8 MG/1
8 TABLET, ORALLY DISINTEGRATING ORAL EVERY 8 HOURS PRN
Status: DISCONTINUED | OUTPATIENT
Start: 2022-08-24 | End: 2022-08-25

## 2022-08-24 RX ORDER — CALCIUM CARBONATE 200(500)MG
500 TABLET,CHEWABLE ORAL 3 TIMES DAILY PRN
Status: DISCONTINUED | OUTPATIENT
Start: 2022-08-24 | End: 2022-08-25

## 2022-08-24 RX ADMIN — ROPIVACAINE HYDROCHLORIDE 12 ML/HR: 2 INJECTION, SOLUTION EPIDURAL; INFILTRATION at 07:08

## 2022-08-24 RX ADMIN — Medication 50 MCG: at 06:08

## 2022-08-24 RX ADMIN — ROPIVACAINE HYDROCHLORIDE 8 ML: 2 INJECTION, SOLUTION EPIDURAL; INFILTRATION at 07:08

## 2022-08-24 RX ADMIN — Medication 2 MILLI-UNITS/MIN: at 09:08

## 2022-08-24 RX ADMIN — LIDOCAINE HYDROCHLORIDE,EPINEPHRINE BITARTRATE 3 ML: 15; .005 INJECTION, SOLUTION EPIDURAL; INFILTRATION; INTRACAUDAL; PERINEURAL at 07:08

## 2022-08-24 RX ADMIN — Medication 25 MCG: at 12:08

## 2022-08-24 NOTE — PROGRESS NOTES
S: states is starting to cramp  O:  VS reviewed, afebrile   Vitals:    08/24/22 0518 08/24/22 0741   BP: (!) 137/55 129/83   Pulse: (!) 115 79   Temp: 97.8 °F (36.6 °C) 97.9 °F (36.6 °C)   TempSrc: Oral Oral       FHTs:  CAT 1 moderate variability with accels, reassuring  UC: Q 3-5  SVE:  FT/60/soft    A: IUP @ 41w1d ;     Patient Active Problem List   Diagnosis    BMI 40.0-44.9, adult    Obesity in pregnancy, antepartum, third trimester    Post-term pregnancy, 40-42 weeks of gestation       P: Cytotec 25 inserted vaginally  Continue close monitoring of maternal/fetal status

## 2022-08-24 NOTE — HOSPITAL COURSE
Admit to LD  VE FT  Cytotec 50mcg  Epidural upon patient request  Anticipate     22: PPD1: Doing well. No complaints. Anticipate discharge tomorrow.     22: PPD2, Routine PP Care. Discharge instructions reviewed including PPD S&S, PIH S&S, bleeding precautions and pain management.

## 2022-08-24 NOTE — SUBJECTIVE & OBJECTIVE
Obstetric HPI:  Patient reports None contractions, active fetal movement, No vaginal bleeding , No loss of fluid     This pregnancy has been complicated by obesity    OB History    Para Term  AB Living   1 0 0 0 0 0   SAB IAB Ectopic Multiple Live Births   0 0 0 0 0      # Outcome Date GA Lbr Frank/2nd Weight Sex Delivery Anes PTL Lv   1 Current              No past medical history on file.  No past surgical history on file.    PTA Medications   Medication Sig    prenatal 105-iron-folic ac-dha 30 mg iron- 1.4 mg-300 mg Cmpk Take by mouth.       Review of patient's allergies indicates:   Allergen Reactions    Codeine      Panic attacks    Penicillins Rash    Sulfa (sulfonamide antibiotics) Rash        Family History       Problem Relation (Age of Onset)    Colon cancer Paternal Grandmother    Heart disease Paternal Grandmother          Tobacco Use    Smoking status: Never Smoker    Smokeless tobacco: Never Used   Substance and Sexual Activity    Alcohol use: Not Currently    Drug use: Never    Sexual activity: Yes     Partners: Male     Review of Systems   All other systems reviewed and are negative.   Objective:     Vital Signs (Most Recent):    Vital Signs (24h Range):           There is no height or weight on file to calculate BMI.    FHT: 140Cat 1 (reassuring)  TOCO:  none    Physical Exam:   Constitutional: She is oriented to person, place, and time. She appears well-developed and well-nourished.       Cardiovascular:  Normal rate.             Pulmonary/Chest: Effort normal. No respiratory distress.        Abdominal: Soft.     Genitourinary:    Vagina and uterus normal.             Musculoskeletal: Moves all extremeties.       Neurological: She is alert and oriented to person, place, and time.    Skin: Skin is warm and dry.    Psychiatric: She has a normal mood and affect.     Cervix: per CNM  Dilation:  0-1  Effacement:  50%  Station: -3  Presentation: Vertex     Significant Labs:  Lab Results    Component Value Date    GROUPTRH B POS 01/10/2022    HEPBSAG Negative 01/10/2022    STREPBCULT No Group B Streptococcus isolated 07/25/2022       I have personallly reviewed all pertinent lab results from the last 24 hours.

## 2022-08-24 NOTE — PROGRESS NOTES
S: Voices no complaints at present  O:  VS reviewed, afebrile   Vitals:    08/24/22 0518 08/24/22 0741 08/24/22 1240 08/24/22 1650   BP: (!) 137/55 129/83 127/82 106/62   Pulse: (!) 115 79 88 84   Temp: 97.8 °F (36.6 °C) 97.9 °F (36.6 °C)  98.1 °F (36.7 °C)   TempSrc: Oral Oral  Oral   SpO2:   99%        FHTs:  CAT 1 reassuring with moderate variability  UC: Irregular  SVE: 2/60 per RN    A: IUP @ 41w1d ;     Patient Active Problem List   Diagnosis    BMI 40.0-44.9, adult    Obesity in pregnancy, antepartum, third trimester    Post-term pregnancy, 40-42 weeks of gestation       P: Will feed and let shower then attempt Cooks catheter   Continue close monitoring of maternal/fetal status

## 2022-08-24 NOTE — H&P
[facility] - Labor & Delivery  Obstetrics  History & Physical    Patient Name: Brittny Turk  MRN: [id number]  Admission Date: 2022  Primary Care Provider: Primary Doctor No    Subjective:     Principal Problem:Post-term pregnancy, 40-42 weeks of gestation    History of Present Illness:  27 y.o.  41w1d here for IOL for postdates        Obstetric HPI:  Patient reports None contractions, active fetal movement, No vaginal bleeding , No loss of fluid     This pregnancy has been complicated by obesity    OB History    Para Term  AB Living   1 0 0 0 0 0   SAB IAB Ectopic Multiple Live Births   0 0 0 0 0      # Outcome Date GA Lbr Frank/2nd Weight Sex Delivery Anes PTL Lv   1 Current              No past medical history on file.  No past surgical history on file.    PTA Medications   Medication Sig    prenatal 105-iron-folic ac-dha 30 mg iron- 1.4 mg-300 mg Cmpk Take by mouth.       Review of patient's allergies indicates:   Allergen Reactions    Codeine      Panic attacks    Penicillins Rash    Sulfa (sulfonamide antibiotics) Rash        Family History       Problem Relation (Age of Onset)    Colon cancer Paternal Grandmother    Heart disease Paternal Grandmother          Tobacco Use    Smoking status: Never Smoker    Smokeless tobacco: Never Used   Substance and Sexual Activity    Alcohol use: Not Currently    Drug use: Never    Sexual activity: Yes     Partners: Male     Review of Systems   All other systems reviewed and are negative.   Objective:     Vital Signs (Most Recent):    Vital Signs (24h Range):           There is no height or weight on file to calculate BMI.    FHT: 140Cat 1 (reassuring)  TOCO:  none    Physical Exam:   Constitutional: She is oriented to person, place, and time. She appears well-developed and well-nourished.       Cardiovascular:  Normal rate.             Pulmonary/Chest: Effort normal. No respiratory distress.        Abdominal: Soft.     Genitourinary:    Vagina  and uterus normal.             Musculoskeletal: Moves all extremeties.       Neurological: She is alert and oriented to person, place, and time.    Skin: Skin is warm and dry.    Psychiatric: She has a normal mood and affect.     Cervix: per CNM  Dilation:  0-1  Effacement:  50%  Station: -3  Presentation: Vertex     Significant Labs:  Lab Results   Component Value Date    GROUPTRH B POS 01/10/2022    HEPBSAG Negative 01/10/2022    STREPBCULT No Group B Streptococcus isolated 2022       I have personallly reviewed all pertinent lab results from the last 24 hours.    Assessment/Plan:     27 y.o. female  at 41w1d for:    * Post-term pregnancy, 40-42 weeks of gestation  Admit to LD  VE FT  Cytotec 50mcg  Epidural upon patient request  Anticipate     Obesity in pregnancy, antepartum, third trimester  lovenox SHANI Wolf, RAMESH  Obstetrics  [facility] - [facility]

## 2022-08-24 NOTE — PLAN OF CARE
[facility] - [facility] & Delivery  Discharge Assessment    Primary Care Provider: Primary Doctor No     OB Screen (most recent)       OB Screen - 22 1021          OB SCREEN    Assessment Type Discharge Planning Assessment (P)      Source of Information health record (P)      Received Prenatal Care Yes (P)      Any indications/suspicions for None (P)      Is this a teen pregnancy No (P)      Is the baby in NICU No (P)      Indication for adoption/[facility] No (P)      Indication for DME/post-acute needs No (P)      HIV (+) No (P)      Any congenital  disorders No (P)      Fetal demise/ death No (P)

## 2022-08-25 PROBLEM — O48.0 POST-TERM PREGNANCY, 40-42 WEEKS OF GESTATION: Status: RESOLVED | Noted: 2022-08-24 | Resolved: 2022-08-25

## 2022-08-25 PROBLEM — O99.213 OBESITY IN PREGNANCY, ANTEPARTUM, THIRD TRIMESTER: Status: RESOLVED | Noted: 2022-05-05 | Resolved: 2022-08-25

## 2022-08-25 LAB
CREAT SERPL-MCNC: 0.7 MG/DL (ref 0.5–1.4)
EST. GFR  (NO RACE VARIABLE): >60 ML/MIN/1.73 M^2

## 2022-08-25 PROCEDURE — 51701 INSERT BLADDER CATHETER: CPT

## 2022-08-25 PROCEDURE — 59400 OBSTETRICAL CARE: CPT | Mod: GB,,, | Performed by: MIDWIFE

## 2022-08-25 PROCEDURE — 72200005 HC VAGINAL DELIVERY LEVEL II: Performed by: MIDWIFE

## 2022-08-25 PROCEDURE — 25000003 PHARM REV CODE 250: Performed by: MIDWIFE

## 2022-08-25 PROCEDURE — 63600175 PHARM REV CODE 636 W HCPCS: Performed by: MIDWIFE

## 2022-08-25 PROCEDURE — 11000001 HC ACUTE MED/SURG PRIVATE ROOM

## 2022-08-25 PROCEDURE — 63600175 PHARM REV CODE 636 W HCPCS: Performed by: ADVANCED PRACTICE MIDWIFE

## 2022-08-25 PROCEDURE — 72100003 HC LABOR CARE, EA. ADDL. 8 HRS

## 2022-08-25 PROCEDURE — 59400 PR FULL ROUT OBSTE CARE,VAGINAL DELIV: ICD-10-PCS | Mod: GB,,, | Performed by: MIDWIFE

## 2022-08-25 PROCEDURE — 25000003 PHARM REV CODE 250: Performed by: ADVANCED PRACTICE MIDWIFE

## 2022-08-25 PROCEDURE — 82565 ASSAY OF CREATININE: CPT | Performed by: MIDWIFE

## 2022-08-25 PROCEDURE — 36415 COLL VENOUS BLD VENIPUNCTURE: CPT | Performed by: MIDWIFE

## 2022-08-25 RX ORDER — PROCHLORPERAZINE EDISYLATE 5 MG/ML
5 INJECTION INTRAMUSCULAR; INTRAVENOUS EVERY 6 HOURS PRN
Status: DISCONTINUED | OUTPATIENT
Start: 2022-08-25 | End: 2022-08-27 | Stop reason: HOSPADM

## 2022-08-25 RX ORDER — SODIUM CHLORIDE 0.9 % (FLUSH) 0.9 %
10 SYRINGE (ML) INJECTION
Status: DISCONTINUED | OUTPATIENT
Start: 2022-08-25 | End: 2022-08-27 | Stop reason: HOSPADM

## 2022-08-25 RX ORDER — DOCUSATE SODIUM 100 MG/1
200 CAPSULE, LIQUID FILLED ORAL 2 TIMES DAILY PRN
Status: DISCONTINUED | OUTPATIENT
Start: 2022-08-25 | End: 2022-08-27 | Stop reason: HOSPADM

## 2022-08-25 RX ORDER — ACETAMINOPHEN 325 MG/1
650 TABLET ORAL EVERY 6 HOURS PRN
Status: DISCONTINUED | OUTPATIENT
Start: 2022-08-25 | End: 2022-08-27 | Stop reason: HOSPADM

## 2022-08-25 RX ORDER — SIMETHICONE 80 MG
1 TABLET,CHEWABLE ORAL EVERY 6 HOURS PRN
Status: DISCONTINUED | OUTPATIENT
Start: 2022-08-25 | End: 2022-08-27 | Stop reason: HOSPADM

## 2022-08-25 RX ORDER — HYDROCODONE BITARTRATE AND ACETAMINOPHEN 5; 325 MG/1; MG/1
1 TABLET ORAL EVERY 4 HOURS PRN
Status: DISCONTINUED | OUTPATIENT
Start: 2022-08-25 | End: 2022-08-27 | Stop reason: HOSPADM

## 2022-08-25 RX ORDER — OXYTOCIN/RINGER'S LACTATE 30/500 ML
95 PLASTIC BAG, INJECTION (ML) INTRAVENOUS ONCE
Status: DISCONTINUED | OUTPATIENT
Start: 2022-08-25 | End: 2022-08-27 | Stop reason: HOSPADM

## 2022-08-25 RX ORDER — ENOXAPARIN SODIUM 100 MG/ML
40 INJECTION SUBCUTANEOUS EVERY 12 HOURS
Status: DISCONTINUED | OUTPATIENT
Start: 2022-08-25 | End: 2022-08-27 | Stop reason: HOSPADM

## 2022-08-25 RX ORDER — PRENATAL WITH FERROUS FUM AND FOLIC ACID 3080; 920; 120; 400; 22; 1.84; 3; 20; 10; 1; 12; 200; 27; 25; 2 [IU]/1; [IU]/1; MG/1; [IU]/1; MG/1; MG/1; MG/1; MG/1; MG/1; MG/1; UG/1; MG/1; MG/1; MG/1; MG/1
1 TABLET ORAL DAILY
Status: DISCONTINUED | OUTPATIENT
Start: 2022-08-25 | End: 2022-08-27 | Stop reason: HOSPADM

## 2022-08-25 RX ORDER — HYDROCORTISONE 25 MG/G
CREAM TOPICAL 3 TIMES DAILY PRN
Status: DISCONTINUED | OUTPATIENT
Start: 2022-08-25 | End: 2022-08-27 | Stop reason: HOSPADM

## 2022-08-25 RX ORDER — DIPHENHYDRAMINE HCL 25 MG
25 CAPSULE ORAL EVERY 4 HOURS PRN
Status: DISCONTINUED | OUTPATIENT
Start: 2022-08-25 | End: 2022-08-27 | Stop reason: HOSPADM

## 2022-08-25 RX ORDER — DIPHENHYDRAMINE HYDROCHLORIDE 50 MG/ML
25 INJECTION INTRAMUSCULAR; INTRAVENOUS EVERY 4 HOURS PRN
Status: DISCONTINUED | OUTPATIENT
Start: 2022-08-25 | End: 2022-08-27 | Stop reason: HOSPADM

## 2022-08-25 RX ORDER — IBUPROFEN 600 MG/1
600 TABLET ORAL EVERY 6 HOURS
Status: DISCONTINUED | OUTPATIENT
Start: 2022-08-25 | End: 2022-08-27 | Stop reason: HOSPADM

## 2022-08-25 RX ORDER — ONDANSETRON 8 MG/1
8 TABLET, ORALLY DISINTEGRATING ORAL EVERY 8 HOURS PRN
Status: DISCONTINUED | OUTPATIENT
Start: 2022-08-25 | End: 2022-08-27 | Stop reason: HOSPADM

## 2022-08-25 RX ADMIN — ENOXAPARIN SODIUM 40 MG: 100 INJECTION SUBCUTANEOUS at 09:08

## 2022-08-25 RX ADMIN — Medication 334 MILLI-UNITS/MIN: at 07:08

## 2022-08-25 RX ADMIN — LIDOCAINE HYDROCHLORIDE 100 MG: 10 INJECTION, SOLUTION EPIDURAL; INFILTRATION; INTRACAUDAL; PERINEURAL at 07:08

## 2022-08-25 RX ADMIN — METHYLERGONOVINE MALEATE 200 MCG: 0.2 INJECTION, SOLUTION INTRAMUSCULAR; INTRAVENOUS at 07:08

## 2022-08-25 RX ADMIN — PRENATAL VITAMINS-IRON FUMARATE 27 MG IRON-FOLIC ACID 0.8 MG TABLET 1 TABLET: at 11:08

## 2022-08-25 RX ADMIN — IBUPROFEN 600 MG: 600 TABLET, FILM COATED ORAL at 11:08

## 2022-08-25 RX ADMIN — IBUPROFEN 600 MG: 600 TABLET, FILM COATED ORAL at 05:08

## 2022-08-25 NOTE — L&D DELIVERY NOTE
[facility] - Labor & Delivery  Vaginal Delivery   Operative Note    SUMMARY     Normal spontaneous vaginal delivery of live infant, was placed on mothers abdomen for skin to skin and bulb suctioning performed.  Infant delivered position JUAN DIEGO over perineum.  Nuchal cord: No.    Spontaneous delivery of placenta and IV pitocin given noting uterine atony with bleeding.  2nd degree laceration noted and repaired in normal fashion with 3-0 chromic .  Patient tolerated delivery well. Sponge needle and lap counted correctly x2.    Indications: Post-term pregnancy, 40-42 weeks of gestation  Pregnancy complicated by:   Patient Active Problem List   Diagnosis    BMI 40.0-44.9, adult     (spontaneous vaginal delivery)    Obstetrical laceration, second degree    Outcome of delivery, single liveborn     Admitting GA: 41w2d    Delivery Information for Abida Turk    Birth information:  YOB: 2022   Time of birth: 7:13 AM   Sex: female   Head Delivery Date/Time: 2022  7:12 AM   Delivery type: Vaginal, Spontaneous   Gestational Age: 41w2d    Delivery Providers    Delivering clinician: Nicolas Haley CNM   Provider Role    Elyssa Faith RN Registered Nurse    Marcelle Chang, RN Registered Nurse    Olga Cornell, RN Registered Nurse    Sary Almonte, RN Registered Nurse    Francine Chisholm RN Registered Nurse    Campbell Olmos, [facility]            Measurements    Weight:   Length:          Apgars    Living status: Living  Apgars:  1 min.:  5 min.:  10 min.:  15 min.:  20 min.:    Skin color:  0  1       Heart rate:  2  2       Reflex irritability:  2  2       Muscle tone:  2  2       Respiratory effort:  2  2       Total:  8  9       Apgars assigned by: JEFFERY ALMONTE RN         Operative Delivery    Forceps attempted?: No  Vacuum extractor attempted?: No         Shoulder Dystocia    Shoulder dystocia present?: No           Presentation    Presentation: Vertex  Position: Occiput  Anterior           Interventions/Resuscitation    Method: Bulb Suctioning, Tactile Stimulation, Deep Suctioning       Cord    Vessels: 3 vessels  Complications: None  Delayed Cord Clamping?: Yes  Cord Clamped Date/Time: 2022  7:15 AM  Cord Blood Disposition: Discarded  Gases Sent?: No  Stem Cell Collection (by MD): No       Placenta    Placenta delivery date/time: 2022 0724  Placenta removal: Spontaneous  Placenta appearance: Intact  Placenta disposition: discarded           Labor Events:       labor: No     Labor Onset Date/Time:         Dilation Complete Date/Time: 2022 05:30     Start Pushing Date/Time: 2022 05:40       Start Pushing Date/Time: 2022 05:40     Rupture Date/Time: 22  0350         Rupture type:          Fluid Amount:       Fluid Color: Meconium Thin      Fluid Odor:       Membrane Status: SRM (Spontaneous Rupture)               steroids: None     Antibiotics given for GBS: No     Induction: misoprostol     Indications for induction:  Post-term Gestation     Augmentation: oxytocin     Indications for augmentation: Ineffective Contraction Pattern     Labor complications: None     Additional complications:          Cervical ripening:                     Delivery:      Episiotomy: None     Indication for Episiotomy:       Perineal Lacerations: 2nd Repaired:  Yes   Periurethral Laceration:   Repaired: No   Labial Laceration:   Repaired:     Sulcus Laceration:   Repaired:     Vaginal Laceration:   Repaired:     Cervical Laceration:   Repaired:     Repair suture:       Repair # of packets: 3     Last Value - EBL - Nursing (mL):       Sum - EBL - Nursing (mL): 0     Last Value - EBL - Anesthesia (mL):      Calculated QBL (mL):       Vaginal Sweep Performed: No     Surgicount Correct: Yes       Other providers:       Anesthesia    Method: Epidural          Details (if applicable):  Trial of Labor      Categorization:      Priority:      Indications for :     Incision Type:       Additional  information:  Forceps:    Vacuum:    Breech:    Observed anomalies    Other (Comments):         RENNY Hitchcock

## 2022-08-25 NOTE — ANESTHESIA POSTPROCEDURE EVALUATION
Anesthesia Post Evaluation    Patient: Brittny Turk    Procedure(s) Performed: * No procedures listed *    Final Anesthesia Type: epidural      Patient location during evaluation: labor & delivery  Patient participation: Yes- Able to Participate  Level of consciousness: awake and alert, awake and oriented  Post-procedure vital signs: reviewed and stable  Pain management: adequate  Airway patency: patent    PONV status at discharge: No PONV  Anesthetic complications: no      Cardiovascular status: blood pressure returned to baseline and hemodynamically stable  Respiratory status: unassisted and spontaneous ventilation  Hydration status: euvolemic  Follow-up not needed.          Vitals Value Taken Time   /82 08/25/22 1002   Temp 36.6 °C (97.8 °F) 08/25/22 0800   Pulse 94 08/25/22 1002   Resp 18 08/25/22 0502   SpO2 100 % 08/25/22 0911   Vitals shown include unvalidated device data.      No case tracking events are documented in the log.      Pain/Dexter Score: No data recorded

## 2022-08-25 NOTE — PLAN OF CARE
Pt progressing well. Up ad chad and voiding without difficulty. Pain controlled with po meds. Vitals stable. Bonding with baby.

## 2022-08-25 NOTE — PROGRESS NOTES
S:comfortable with epidural  O:  VS reviewed, afebrile   Vitals:    08/25/22 0502 08/25/22 0503 08/25/22 0508 08/25/22 0513   BP: (!) 137/97      Pulse: 89 81 94 94   Resp: 18      Temp:       TempSrc:       SpO2:  100% 100% 100%       FHTs: CAT  1-2 reassuring, moderate variability, intermittent variables  UC: adjusted  SVE:  Anterior rim    A: IUP @ 41w2d ;     Patient Active Problem List   Diagnosis    BMI 40.0-44.9, adult    Obesity in pregnancy, antepartum, third trimester    Post-term pregnancy, 40-42 weeks of gestation       P: CPM  Continue close monitoring of maternal/fetal status

## 2022-08-25 NOTE — PROGRESS NOTES
S: comfortable with  epidural  O:  VS reviewed, afebrile   Vitals:    08/24/22 1938 08/24/22 1941 08/24/22 1944 08/24/22 1947   BP: 119/60 128/70 102/74 107/62   Pulse: (!) 116 108 109 98   Temp:       TempSrc:       SpO2:           FHTs:  CAT 1 reassuring with moderate variability  UC:  Q 2-3  SVE 1.5/60/V/-2, Cooks catheter inserted, 80cc/80cc, tolerated well    A: IUP @ 41w1d ;     Patient Active Problem List   Diagnosis    BMI 40.0-44.9, adult    Obesity in pregnancy, antepartum, third trimester    Post-term pregnancy, 40-42 weeks of gestation       P: pitocin PRN  Continue close monitoring of maternal/fetal status

## 2022-08-25 NOTE — ANESTHESIA PROCEDURE NOTES
Epidural    Patient location during procedure: OB   Reason for block: primary anesthetic   Reason for block: labor analgesia requested by patient and obstetrician  Diagnosis: IUP   Start time: 8/24/2022 7:18 PM  Timeout: 8/24/2022 7:18 PM  End time: 8/24/2022 7:36 PM  Surgery related to: Planned vaginal delivery    Staffing  Performing Provider: Fran Oviedo CRNA  Authorizing Provider: Bran Bell MD        Preanesthetic Checklist  Completed: patient identified, IV checked, site marked, risks and benefits discussed, surgical consent, monitors and equipment checked, pre-op evaluation, timeout performed, anesthesia consent given, hand hygiene performed and patient being monitored  Preparation  Patient position: sitting  Prep: Betadine  Patient monitoring: Pulse Ox and Blood Pressure  Reason for block: primary anesthetic   Epidural  Skin Anesthetic: lidocaine 1%  Skin Wheal: 3 mL  Administration type: continuous  Approach: midline  Interspace: L3-4    Injection technique: MALA air  Needle and Epidural Catheter  Needle type: Tuohy   Needle gauge: 17  Needle length: 3.5 inches  Needle insertion depth: 7.5 cm  Catheter type: springwound and multi-orifice  Catheter size: 18 G  Catheter at skin depth: 15 cm  Insertion Attempts: 1  Test dose: 3 mL of lidocaine 1.5% with Epi 1-to-200,000  Additional Documentation: incremental injection, negative aspiration for heme and CSF, no paresthesia on injection, no signs/symptoms of IV or SA injection, no significant pain on injection and no significant complaints from patient  Needle localization: anatomical landmarks  Medications:  Volume per aspiration: 0 mL  Time between aspirations: 2 minutes   Assessment  Upper dermatomal levels - Left: T10  Right: T10   Dermatomal levels determined by pinch or prick  Ease of block: easy  Patient's tolerance of the procedure: comfortable throughout block and no complaints No inadvertent dural puncture with Tuohy.  Dural puncture not  performed with spinal needle

## 2022-08-25 NOTE — ANESTHESIA PREPROCEDURE EVALUATION
08/24/2022  Brittny Turk is a 27 y.o., female.      Pre-op Assessment    I have reviewed the Patient Summary Reports.     I have reviewed the Nursing Notes.    I have reviewed the Medications.     Review of Systems  Anesthesia Hx:  Neg history of prior surgery. Denies Family Hx of Anesthesia complications.   Denies Personal Hx of Anesthesia complications.   OB/GYN/PEDS:  Planned Vaginal Delivery        Physical Exam  General: Well nourished, Cooperative and Alert    Airway:  Mallampati: II   Mouth Opening: Normal  TM Distance: Normal  Tongue: Normal  Neck ROM: Normal ROM    Dental:  Intact    Chest/Lungs:  Clear to auscultation, Normal Respiratory Rate    Heart:  Rate: Normal  Rhythm: Regular Rhythm  Sounds: Normal        Anesthesia Plan  Type of Anesthesia, risks & benefits discussed:    Anesthesia Type: Epidural  Intra-op Monitoring Plan: Standard ASA Monitors  Post Op Pain Control Plan: epidural analgesia  Informed Consent: Informed consent signed with the Patient and all parties understand the risks and agree with anesthesia plan.  All questions answered.   ASA Score: 2  Day of Surgery Review of History & Physical: H&P Update referred to the surgeon/provider.    Ready For Surgery From Anesthesia Perspective.     .

## 2022-08-25 NOTE — LACTATION NOTE
This note was copied from a baby's chart.  Lactation rounds:    Upon entering room, primary nurse assisting with latching infant. Infant showing feeding cues. Helped mother to settle in a football hold position on the left breast. Reviewed deep asymmetric latch and proper positioning. Mother is able to demonstrate back and deep latch easily obtained. Audible swallows noted; mother experiencing nipple soreness. Baby fed until content, and nipple shape and color is WDL upon unlatching. Hand expression and nipple care performed with EBM and lanolin.    Mother was taught hand expression of breastmilk/colostrum. She was instructed to:   Sit upright and lean forward, if possible.   When feasible, apply warm, wet compress over breasts for a few minutes.    Perform gentle breast massage.   Form a C with her hand and place it about 1 inch back from the areola with the nipple centered between her index finger and her thumb.   Press, compress, relax:  Using her finger and thumb, apply pressure in an inward direction toward the breast without stretching the tissue, compress the breast tissue between her finger and thumb, then relax her finger and thumb. Repeat process for a few minutes.   Rotate placement of finger and thumb on the breasts to facilitate emptying.   Collect expressed breastmilk/colostrum with a spoon or cup and feed immediately to the baby, if able.   If unable to feed immediately, place breastmilk/colostrum directly into a sterile storage container for later use. Place the babys breast milk label (with the date and time of collection and the names of mother's medications) on the container. Reviewed proper handling and storage of expressed breastmilk.   Patient effectively return demonstrated and verbalized understanding.    Mother verbalizes understanding of expected  behaviors and output for the first 48 hours of life.  Discussed the importance of cue based feedings on demand, unrestricted  access to the breast, and frequent uninterrupted skin to skin contact.      Mother denies any further lactation needs or concerns at this time. Encouraged mother to call for assistance when desired or when infant is showing signs of hunger. Lactation availability discussed. Mother verbalizes understanding of all education and counseling.

## 2022-08-26 PROCEDURE — 25000003 PHARM REV CODE 250: Performed by: MIDWIFE

## 2022-08-26 PROCEDURE — 11000001 HC ACUTE MED/SURG PRIVATE ROOM

## 2022-08-26 PROCEDURE — 63600175 PHARM REV CODE 636 W HCPCS: Performed by: MIDWIFE

## 2022-08-26 RX ADMIN — IBUPROFEN 600 MG: 600 TABLET, FILM COATED ORAL at 11:08

## 2022-08-26 RX ADMIN — DOCUSATE SODIUM 200 MG: 100 CAPSULE, LIQUID FILLED ORAL at 08:08

## 2022-08-26 RX ADMIN — ENOXAPARIN SODIUM 40 MG: 100 INJECTION SUBCUTANEOUS at 08:08

## 2022-08-26 RX ADMIN — IBUPROFEN 600 MG: 600 TABLET, FILM COATED ORAL at 05:08

## 2022-08-26 RX ADMIN — IBUPROFEN 600 MG: 600 TABLET, FILM COATED ORAL at 12:08

## 2022-08-26 RX ADMIN — PRENATAL VITAMINS-IRON FUMARATE 27 MG IRON-FOLIC ACID 0.8 MG TABLET 1 TABLET: at 08:08

## 2022-08-26 NOTE — LACTATION NOTE
Lactation rounds: Infant output (3 voids, 2 stools) and weight loss WNL     Mother reports that breastfeeding is going better today. Bruising noted outside of right areola. Mother states that it is from the baby latching. Nipple and breast swelling noted to left side. Mother has large, pendulous breasts; encouraged to wear a supportive bra and lie back to allow fluid to drain. Mother verbalizes understanding.    Reinforced infant feeding & output pattern, cue based feeds & unrestricted access to the breast. Instructed mother to feed 8 or more times in 24 hours. Hand expression reviewed. Benefits of skin to skin and rooming in discussed. Infant showing early feeding cues. Offered latch assistance; mother politely declines and states that infant has gas and that she just ate 2 hours ago. Cluster feeding behavior reviewed. Parents verbalizes understanding.    Mother denies any further lactation needs or concerns at this time. Encouraged mother to call for assistance when desired or when infant is showing signs of hunger. Lactation availability discussed. Mother verbalizes understanding of all education and counseling.

## 2022-08-26 NOTE — PLAN OF CARE
VSS. Fundus firm; bleeding light. No large clots passed this shift. Pain controlled with Motrin. Pt ambulates frequently. Mother bonding with infant. POC reviewed with patient.

## 2022-08-26 NOTE — SUBJECTIVE & OBJECTIVE
Interval History: PPD1    She is doing well this morning. She is tolerating a regular diet without nausea or vomiting. She is voiding spontaneously. She is ambulating. She has passed flatus, and has not a BM. Vaginal bleeding is mild. She denies fever or chills. Abdominal pain is mild and controlled with oral medications. She Is breastfeeding.   Objective:     Vital Signs (Most Recent):  Temp: 97.8 °F (36.6 °C) (08/26/22 0728)  Pulse: 85 (08/26/22 0728)  Resp: 20 (08/26/22 0728)  BP: (!) 102/59 (08/26/22 0728)  SpO2: 98 % (08/26/22 0728)   Vital Signs (24h Range):  Temp:  [97.8 °F (36.6 °C)-98.6 °F (37 °C)] 97.8 °F (36.6 °C)  Pulse:  [68-86] 85  Resp:  [18-20] 20  SpO2:  [98 %] 98 %  BP: (102-124)/(58-78) 102/59     Weight: 115.7 kg (255 lb)  Body mass index is 46.64 kg/m².      Intake/Output Summary (Last 24 hours) at 8/26/2022 1120  Last data filed at 8/25/2022 1500  Gross per 24 hour   Intake --   Output 1100 ml   Net -1100 ml         Significant Labs:  Lab Results   Component Value Date    GROUPTRH B POS 08/24/2022    HEPBSAG Negative 01/10/2022    STREPBCULT No Group B Streptococcus isolated 07/25/2022     No results for input(s): HGB, HCT in the last 48 hours.    I have personallly reviewed all pertinent lab results from the last 24 hours.  Recent Lab Results       None            Physical Exam:   Constitutional: She is oriented to person, place, and time. She appears well-developed and well-nourished.    HENT:   Head: Normocephalic and atraumatic.    Eyes: Conjunctivae and EOM are normal.     Cardiovascular:  Normal rate, regular rhythm and normal heart sounds.             Pulmonary/Chest: Effort normal and breath sounds normal. No respiratory distress.        Abdominal: Soft. Bowel sounds are normal. She exhibits no distension. There is no abdominal tenderness.     Genitourinary:    Vagina and uterus normal.   Vaginal discharge: Lochia light per patient.           Musculoskeletal: Normal range of motion and  moves all extremeties.       Neurological: She is alert and oriented to person, place, and time.    Skin: Skin is warm and dry.    Psychiatric: She has a normal mood and affect. Her behavior is normal. Judgment and thought content normal.

## 2022-08-26 NOTE — PROGRESS NOTES
[facility] ([facility])  Obstetrics  Postpartum Progress Note    Patient Name: Brittny Turk  MRN: [id number]  Admission Date: 2022  Hospital Length of Stay: 2 days  Attending Physician: Sole Lin MD  Primary Care Provider: Primary Doctor No    Subjective:     Principal Problem:Post-term pregnancy, 40-42 weeks of gestation    Hospital Course:  Admit to   VE FT  Cytotec 50mcg  Epidural upon patient request  Anticipate     22: PPD1: Doing well. No complaints. Anticipate discharge tomorrow.       Interval History: PPD1    She is doing well this morning. She is tolerating a regular diet without nausea or vomiting. She is voiding spontaneously. She is ambulating. She has passed flatus, and has not a BM. Vaginal bleeding is mild. She denies fever or chills. Abdominal pain is mild and controlled with oral medications. She Is breastfeeding.   Objective:     Vital Signs (Most Recent):  Temp: 97.8 °F (36.6 °C) (22)  Pulse: 85 (22)  Resp: 20 (22)  BP: (!) 102/59 (22)  SpO2: 98 % (22)   Vital Signs (24h Range):  Temp:  [97.8 °F (36.6 °C)-98.6 °F (37 °C)] 97.8 °F (36.6 °C)  Pulse:  [68-86] 85  Resp:  [18-20] 20  SpO2:  [98 %] 98 %  BP: (102-124)/(58-78) 102/59     Weight: 115.7 kg (255 lb)  Body mass index is 46.64 kg/m².      Intake/Output Summary (Last 24 hours) at 2022 1120  Last data filed at 2022 1500  Gross per 24 hour   Intake --   Output 1100 ml   Net -1100 ml         Significant Labs:  Lab Results   Component Value Date    GROUPTRH B POS 2022    HEPBSAG Negative 01/10/2022    STREPBCULT No Group B Streptococcus isolated 2022     No results for input(s): HGB, HCT in the last 48 hours.    I have personallly reviewed all pertinent lab results from the last 24 hours.  Recent Lab Results       None            Physical Exam:   Constitutional: She is oriented to person, place, and time. She appears well-developed and  well-nourished.    HENT:   Head: Normocephalic and atraumatic.    Eyes: Conjunctivae and EOM are normal.     Cardiovascular:  Normal rate, regular rhythm and normal heart sounds.             Pulmonary/Chest: Effort normal and breath sounds normal. No respiratory distress.        Abdominal: Soft. Bowel sounds are normal. She exhibits no distension. There is no abdominal tenderness.     Genitourinary:    Vagina and uterus normal.   Vaginal discharge: Lochia light per patient.           Musculoskeletal: Normal range of motion and moves all extremeties.       Neurological: She is alert and oriented to person, place, and time.    Skin: Skin is warm and dry.    Psychiatric: She has a normal mood and affect. Her behavior is normal. Judgment and thought content normal.     Assessment/Plan:     27 y.o. female  for:    Outcome of delivery, single liveborn  Cared for by [facility]    Obstetrical laceration, second degree  Routine tio care.     (spontaneous vaginal delivery)  Routine PP care        Disposition: As patient meets milestones, will plan to discharge tomorrow.    RENNY Loyola, RAMESH  Obstetrics  [facility] ([facility])

## 2022-08-27 VITALS
TEMPERATURE: 98 F | DIASTOLIC BLOOD PRESSURE: 70 MMHG | HEART RATE: 96 BPM | WEIGHT: 255 LBS | SYSTOLIC BLOOD PRESSURE: 112 MMHG | RESPIRATION RATE: 20 BRPM | BODY MASS INDEX: 46.64 KG/M2 | OXYGEN SATURATION: 99 %

## 2022-08-27 PROCEDURE — 99024 PR POST-OP FOLLOW-UP VISIT: ICD-10-PCS | Mod: ,,, | Performed by: ADVANCED PRACTICE MIDWIFE

## 2022-08-27 PROCEDURE — 63600175 PHARM REV CODE 636 W HCPCS: Performed by: MIDWIFE

## 2022-08-27 PROCEDURE — 25000003 PHARM REV CODE 250: Performed by: ADVANCED PRACTICE MIDWIFE

## 2022-08-27 PROCEDURE — 25000003 PHARM REV CODE 250: Performed by: MIDWIFE

## 2022-08-27 PROCEDURE — 99024 POSTOP FOLLOW-UP VISIT: CPT | Mod: ,,, | Performed by: ADVANCED PRACTICE MIDWIFE

## 2022-08-27 RX ORDER — FERROUS SULFATE 325(65) MG
325 TABLET, DELAYED RELEASE (ENTERIC COATED) ORAL DAILY
Qty: 30 TABLET | Refills: 2 | Status: SHIPPED | OUTPATIENT
Start: 2022-08-27

## 2022-08-27 RX ORDER — LANOLIN ALCOHOL/MO/W.PET/CERES
1 CREAM (GRAM) TOPICAL DAILY
Status: DISCONTINUED | OUTPATIENT
Start: 2022-08-27 | End: 2022-08-27 | Stop reason: HOSPADM

## 2022-08-27 RX ORDER — IBUPROFEN 600 MG/1
600 TABLET ORAL EVERY 6 HOURS PRN
Qty: 30 TABLET | Refills: 0 | Status: SHIPPED | OUTPATIENT
Start: 2022-08-27

## 2022-08-27 RX ADMIN — ENOXAPARIN SODIUM 40 MG: 100 INJECTION SUBCUTANEOUS at 08:08

## 2022-08-27 RX ADMIN — FERROUS SULFATE TAB 325 MG (65 MG ELEMENTAL FE) 1 EACH: 325 (65 FE) TAB at 08:08

## 2022-08-27 RX ADMIN — PRENATAL VITAMINS-IRON FUMARATE 27 MG IRON-FOLIC ACID 0.8 MG TABLET 1 TABLET: at 08:08

## 2022-08-27 RX ADMIN — IBUPROFEN 600 MG: 600 TABLET, FILM COATED ORAL at 12:08

## 2022-08-27 RX ADMIN — IBUPROFEN 600 MG: 600 TABLET, FILM COATED ORAL at 05:08

## 2022-08-27 NOTE — SUBJECTIVE & OBJECTIVE
Interval History:     She is doing well this morning. She is tolerating a regular diet without nausea or vomiting. She is voiding spontaneously. She is ambulating. Vaginal bleeding is mild. She denies fever or chills. Abdominal pain is mild and controlled with oral medications. She Is breastfeeding. She desires circumcision for her male baby: not applicable.    Objective:     Vital Signs (Most Recent):  Temp: 98.3 °F (36.8 °C) (08/27/22 0752)  Pulse: 87 (08/27/22 0752)  Resp: 20 (08/27/22 0752)  BP: (!) 110/54 (08/27/22 0752)  SpO2: 99 % (08/27/22 0752)   Vital Signs (24h Range):  Temp:  [97.5 °F (36.4 °C)-99 °F (37.2 °C)] 98.3 °F (36.8 °C)  Pulse:  [] 87  Resp:  [18-20] 20  SpO2:  [98 %-99 %] 99 %  BP: (103-135)/(54-83) 110/54     Weight: 115.7 kg (255 lb)  Body mass index is 46.64 kg/m².    No intake or output data in the 24 hours ending 08/27/22 1122      Significant Labs:  Lab Results   Component Value Date    GROUPTRH B POS 08/24/2022    HEPBSAG Negative 01/10/2022    STREPBCULT No Group B Streptococcus isolated 07/25/2022     No results for input(s): HGB, HCT in the last 48 hours.    I have personallly reviewed all pertinent lab results from the last 24 hours.    Physical Exam:   Constitutional: She is oriented to person, place, and time. She appears well-developed and well-nourished.    HENT:   Head: Normocephalic.      Cardiovascular:  Normal rate.             Pulmonary/Chest: Effort normal.        Abdominal: Soft. There is no abdominal tenderness.             Musculoskeletal: Normal range of motion and moves all extremeties.       Neurological: She is alert and oriented to person, place, and time.    Skin: Skin is warm and dry.    Psychiatric: She has a normal mood and affect. Her behavior is normal. Judgment and thought content normal.

## 2022-08-27 NOTE — DISCHARGE SUMMARY
[facility] ([facility])  Obstetrics  Discharge Summary      Patient Name: Brittny Turk  MRN: [id number]  Admission Date: 2022  Hospital Length of Stay: 3 days  Discharge Date and Time:  2022 11:26 AM  Attending Physician: Sole Lin MD   Discharging Provider: Mina Polanco CNM   Primary Care Provider: Primary Doctor No    HPI: 27 y.o.  41w1d here for IOL for postdates            * No surgery found *     Hospital Course:   Admit to   VE FT  Cytotec 50mcg  Epidural upon patient request  Anticipate     22: PPD1: Doing well. No complaints. Anticipate discharge tomorrow.     22: PPD2, Routine PP Care. Discharge instructions reviewed including PPD S&S, PIH S&S, bleeding precautions and pain management.            Final Active Diagnoses:    Diagnosis Date Noted POA    PRINCIPAL PROBLEM:   (spontaneous vaginal delivery) [O80] 2022 Not Applicable    Obstetrical laceration, second degree [O70.1] 2022 No    Outcome of delivery, single liveborn [Z37.0] 2022 Not Applicable      Problems Resolved During this Admission:    Diagnosis Date Noted Date Resolved POA    Post-term pregnancy, 40-42 weeks of gestation [O48.0] 2022 Unknown    Obesity in pregnancy, antepartum, third trimester [O99.213] 2022 Yes        Significant Diagnostic Studies: Labs: All labs within the past 24 hours have been reviewed      Feeding Method: breast    Immunizations     None          Delivery:    Episiotomy: None   Lacerations: 2nd   Repair suture:     Repair # of packets: 3   Blood loss (ml):       Birth information:  YOB: 2022   Time of birth: 7:13 AM   Sex: female   Delivery type: Vaginal, Spontaneous   Gestational Age: 41w2d    Delivery Clinician:      Other providers:       Additional  information:  Forceps:    Vacuum:    Breech:    Observed anomalies      Living?:           APGARS  One minute Five minutes Ten minutes   Skin  color:         Heart rate:         Grimace:         Muscle tone:         Breathing:         Totals: 8  9        Placenta: Delivered:       appearance    Pending Diagnostic Studies:     None          Discharged Condition: stable    Disposition: Home or Self Care    Follow Up:   Follow-up Information     Nicolas Haley CNM. Go in 4 week(s).    Specialty: Obstetrics and Gynecology  Contact information:  [address] [facility] [address] [phone]                       Patient Instructions:      Notify your health care provider if you experience any of the following:  temperature >100.4     Notify your health care provider if you experience any of the following:  persistent nausea and vomiting or diarrhea     Notify your health care provider if you experience any of the following:  severe uncontrolled pain     Notify your health care provider if you experience any of the following:  difficulty breathing or increased cough     Notify your health care provider if you experience any of the following:  severe persistent headache     Notify your health care provider if you experience any of the following:  persistent dizziness, light-headedness, or visual disturbances     Notify your health care provider if you experience any of the following:  increased confusion or weakness     Medications:  Current Discharge Medication List      START taking these medications    Details   ferrous sulfate 325 (65 FE) MG EC tablet Take 1 tablet (325 mg total) by mouth once daily.  Qty: 30 tablet, Refills: 2      ibuprofen (ADVIL,MOTRIN) 600 MG tablet Take 1 tablet (600 mg total) by mouth every 6 (six) hours as needed for Pain.  Qty: 30 tablet, Refills: 0         CONTINUE these medications which have NOT CHANGED    Details   prenatal 105-iron-folic ac-dha 30 mg iron- 1.4 mg-300 mg Cmpk Take by mouth.             Mina Polanco CNM  Obstetrics  [facility] ([facility])

## 2022-08-27 NOTE — DISCHARGE INSTRUCTIONS
"Mother Self Care:    Activity: Avoid strenuous exercise and get adequate rest.  No driving until the physician consent given.  Emotional Changes: Most women find birth to be a time of great emotional upheaval.  Sense of loss, mood swings, fatigue, anxiety, and feeling "let down" are common.  If feelings worsen or last more than a week, call your physician.  Breast Care/Breastfeeding: Wear a bra for comfort.  Keep nipples dry and apply your own breast milk or lanolin cream as needed for soreness.  Engorgement can be relieved with warm, moist heat before feedings.  You may also take Ibuprofen.  Breast Care/Bottle Feeding: Wear support bra 24 hours a day for one week.  Avoid stimulation to breasts.  You may use ice packs for discomfort.  Tio-Care/Vaginal Bleeding: Remember to use your tio-bottle after urinating.  Your flow will change from red, to pink, to yellow/white color over a period of 2 weeks.  Menstruation will return in 3-8 weeks, or longer if breastfeeding.  Episiotomy Vaginal Delivery: Stitches will dissolve within 10 days to 3 weeks.  Warm baths, tucks, and dermoplast will promote healing.  Avoid bubble baths or strong soaps.   Section/Tubal Ligation: Keep incision clean and dry. You may shower, but avoid baths.  Sexual Activity/Pelvic Rest: No sexual activity, tampons, or douching until your physician gives you consent.  Diet: Continue to eat from the five basic food groups, including plenty of protein, fruits, vegetables, and whole grains.  Limit empty calories and high fat foods.  Drink enough fluids to satisfy thirst and add an extra 500 calories for breastfeeding.  Constipation/Hemorrhoids: Drink plenty of water.  You may take a stool softener or natural laxative (Metamucil). You may use tucks or hemorrhoid ointment and soak in a warm tub.    CALL YOUR OB DOCTOR IF ANY OF THE FOLLOWING OCCURS:  *Heavy bleeding - saturating a pad an hour or passing any large (2-3 inches in size) blood " clots.  *Any pain, redness, or tenderness in lower leg.  *You cannot care for yourself or your baby.  *Any signs of infection-      - Temperature greater than 100.5 degrees F      - Foul smelling vaginal discharge and/or incisional drainage      - Increased episiotomy or incisional pain      - Hot, hard, red or sore area on breast      - Flu-like symptoms      - Any urgency, frequency or burning with urination    Return To the Hospital for further Evaluation:  Headache not relieved by tylenol or ibuprofen  Blurry vision, double vision, seeing spots, or flashing lights  Feeling faint or passing out  Right epigastric pain  Difficulty breathing  Swelling in hands, face, or feet  Any of these symptoms accompanied by nausea/vomiting  Gaining more than 5 pounds in one week  Seizures  These symptoms could be an indication of elevated blood pressure.       If you have any questions that need to be answered immediately please call the Labor & Delivery Unit at [phone] and ask to speak to a nurse.

## 2022-08-27 NOTE — PROGRESS NOTES
[facility] ([facility])  Obstetrics  Postpartum Progress Note    Patient Name: Brittny Turk  MRN: [id number]  Admission Date: 2022  Hospital Length of Stay: 3 days  Attending Physician: Sole Lin MD  Primary Care Provider: Primary Doctor No    Subjective:     Principal Problem: (spontaneous vaginal delivery)    Hospital Course:  Admit to   VE FT  Cytotec 50mcg  Epidural upon patient request  Anticipate     22: PPD1: Doing well. No complaints. Anticipate discharge tomorrow.     22: PPD2, Routine PP Care. Discharge instructions reviewed including PPD S&S, PIH S&S, bleeding precautions and pain management.       Interval History:     She is doing well this morning. She is tolerating a regular diet without nausea or vomiting. She is voiding spontaneously. She is ambulating. Vaginal bleeding is mild. She denies fever or chills. Abdominal pain is mild and controlled with oral medications. She Is breastfeeding. She desires circumcision for her male baby: not applicable.    Objective:     Vital Signs (Most Recent):  Temp: 98.3 °F (36.8 °C) (22)  Pulse: 87 (22)  Resp: 20 (22)  BP: (!) 110/54 (22)  SpO2: 99 % (22)   Vital Signs (24h Range):  Temp:  [97.5 °F (36.4 °C)-99 °F (37.2 °C)] 98.3 °F (36.8 °C)  Pulse:  [] 87  Resp:  [18-20] 20  SpO2:  [98 %-99 %] 99 %  BP: (103-135)/(54-83) 110/54     Weight: 115.7 kg (255 lb)  Body mass index is 46.64 kg/m².    No intake or output data in the 24 hours ending 22 112      Significant Labs:  Lab Results   Component Value Date    GROUPTRH B POS 2022    HEPBSAG Negative 01/10/2022    STREPBCULT No Group B Streptococcus isolated 2022     No results for input(s): HGB, HCT in the last 48 hours.    I have personallly reviewed all pertinent lab results from the last 24 hours.    Physical Exam:   Constitutional: She is oriented to person, place, and time. She appears  well-developed and well-nourished.    HENT:   Head: Normocephalic.      Cardiovascular:  Normal rate.             Pulmonary/Chest: Effort normal.        Abdominal: Soft. There is no abdominal tenderness.             Musculoskeletal: Normal range of motion and moves all extremeties.       Neurological: She is alert and oriented to person, place, and time.    Skin: Skin is warm and dry.    Psychiatric: She has a normal mood and affect. Her behavior is normal. Judgment and thought content normal.     Assessment/Plan:     27 y.o. female  for:    *  (spontaneous vaginal delivery)  Routine PP care  Discharge instructions reviewed including PPD S&S, PIH S&S, bleeding precautions and pain management.     Outcome of delivery, single liveborn  Cared for by Peds    Obstetrical laceration, second degree  Routine tio care.        Disposition: As patient meets milestones, will plan to discharge today.    Mina Polanco CNM  Obstetrics  [facility] ([facility])

## 2022-08-27 NOTE — ASSESSMENT & PLAN NOTE
Routine PP care  Discharge instructions reviewed including PPD S&S, PIH S&S, bleeding precautions and pain management.

## 2022-08-27 NOTE — LACTATION NOTE
Lactation Rounds: Infant output- voids x3 and stools 0. Infant breast fed 4 times in last 24hours and given formula x3. Mother verbalizes that she experiences painful latch sometimes and other times the latch feels fine. Baby is showing feeding cues. Helped mother to settle in a football hold position on the right breast. Reviewed deep asymmetric latch and proper positioning. Mother is able to demonstrate back and deep latch easily obtained. Audible swallows noted, and mother denies pain or discomfort. Baby fed until content, and nipple shape and color is WDL upon unlatching. Reviewed hand expression and nipple care; mother able to return back demonstration.       Mother anticipates discharge home today. Reviewed signs of good attachment. Reviewed breast massage and compression during feedings and indications for use. Reviewed signs of effective milk transfer and instructed to call pediatrician and lactation if signs not present. Discussed expected feeding and output pattern for days of life 2, 3, 4, & 5+; mother instructed to call pediatrician and lactation if infant is not meeting feeding and output goals.     Reviewed signs of engorgement and expectant management. Reviewed signs of mastitis and instructed mother to call OB provider and lactation if any signs present. Discussed proper use of First Alert Form. Reviewed proper milk handling, collection and storage guidelines. Reviewed nursing diet and nutrition. Discussed resources for medication safety while breastfeeding. Reviewed available outpatient lactation resources.     Mother verbalizes understanding of all education and counseling; she denies any further lactation needs or concerns at this time. Encouraged mother to contact lactation with any questions, concerns, or problems, contact number provided.

## 2022-08-27 NOTE — PROGRESS NOTES
Discharge teaching completed and questions answered. Patient brought to vehicle via wheelchair in stable condition.

## 2022-08-30 ENCOUNTER — TELEPHONE (OUTPATIENT)
Dept: OBSTETRICS AND GYNECOLOGY | Facility: CLINIC | Age: 27
End: 2022-08-30
Payer: COMMERCIAL

## 2022-08-30 RX ORDER — NITROFURANTOIN 25; 75 MG/1; MG/1
100 CAPSULE ORAL 2 TIMES DAILY
Qty: 14 CAPSULE | Refills: 0 | Status: SHIPPED | OUTPATIENT
Start: 2022-08-30 | End: 2022-09-06

## 2022-08-30 NOTE — TELEPHONE ENCOUNTER
"Patient is experiencing urgency, back pain and some pain with urination.  Patient stated back pain is usually "her" sign of UTI.  Advised patient message would be sent to providers.  "

## 2022-08-30 NOTE — TELEPHONE ENCOUNTER
----- Message from James Mckinney sent at 8/30/2022  9:46 AM CDT -----  Pt is needing a call back regarding if she can get an antibiotic for a UTI. Please call .[phone]

## 2022-09-02 NOTE — PHYSICIAN QUERY
PT Name: Brittny Turk  MR #: [id number]     DOCUMENTATION CLARIFICATION     CDS/: RAJENDRA Dueñas,RNC-MNN       Contact information:[email or web address]  This form is a permanent document in the medical record.    Query Date: September 2, 2022  By submitting this query, we are merely seeking further clarification of documentation. Please utilize your independent clinical judgment when addressing the question(s) below.      Indicators Supporting Clinical Findings Location in Medical Record   X Documentation of uterine atony with bleeding, post-partum bleeding, or hemorrhage noting uterine atony with bleeding L&D Delivery note 8/25    Documentation of retained placenta     X Delivery type with EBL or QBL Normal spontaneous vaginal delivery of live infant    Calculated Running QBL Total (mL)500 L&D Delivery note 8/25      QBL Calculator 8/25    H/H      Vital signs     X Medications, Treatment IV pitocin given noting uterine atony with bleeding L&D Delivery note 8/25    Blood transfusion      Other          Provider, please specify the diagnosis or diagnoses associated with the above clinical findings:      [   ] Immediate post-partum hemorrhage   [  x ] Postpartum uterine atony without hemorrhage   [   ] Other hematological diagnosis (please specify): _________________   [  ] Clinically undetermined        Please document in your progress notes daily for the duration of treatment, until resolved, and include in your discharge summary.  (Form 58350)

## 2025-07-24 ENCOUNTER — TELEPHONE (OUTPATIENT)
Dept: OBSTETRICS AND GYNECOLOGY | Facility: CLINIC | Age: 30
End: 2025-07-24

## 2025-07-24 NOTE — TELEPHONE ENCOUNTER
Copied from CRM #[id number]. Topic: Appointments - Appointment Access  >> Jul 24, 2025  4:20 PM Jim wrote:  Type:  NP/OB     Who Called: Brittny   Symptoms (please be specific): NP/OB    How long has patient had these symptoms:    Pharmacy name and phone #:    Would the patient rather a call back or a response via [facility]? Call back   Best Call Back Number: [phone]  Additional Information:

## 2025-07-24 NOTE — TELEPHONE ENCOUNTER
Copied from CRM #[id number]. Topic: Appointments - Appointment Access  >> Jul 24, 2025  2:15 PM Jennifer wrote:  Type: NOB Confirmation     Who Called: Brittny Turk    Symptoms : NOB   Missed cycle: yes  Home tested: no   Would the patient rather a call back or a response via [facility]?    Best call back number: [phone]    Additional Info: needs confirmation approx 6-7 wks ob ( self pay, at present applied for medicaid this morning)

## 2025-07-24 NOTE — TELEPHONE ENCOUNTER
Pt called in regards to New Pregnancy Appt.  Pt states she did not take a home test but she is late and have nausea an vomiting.   Advised pt to get home test once confirmed call back and we can schedule appt. Pt VU . Pt states she does not have ins and will be narayan pay.     Maida LOPEZ LPN  OB/GYN

## 2025-07-24 NOTE — TELEPHONE ENCOUNTER
"Called pt informed her we need to take her information down before scheduling a New OB Appointment. Pt stated "Y'all do not need my information all I want to do is confirm my pregnancy and get some zofran and phenergan." I advised pt we do not do that we need her to take an at home pregnancy test to confirm she is pregnant then we can get her in for an appointment. Pt stated "I do not want to deliver with a doctor I want to deliver with a midwife"  I advised pt we do not work with midwives. She stated "is this [facility] in [facility]?" I let her know that this is not the one in [place] we are in [place]. Pt stated she is hours away from [place] and did not mean to make an appointment here  "

## 2025-07-25 ENCOUNTER — TELEPHONE (OUTPATIENT)
Dept: OBSTETRICS AND GYNECOLOGY | Facility: CLINIC | Age: 30
End: 2025-07-25

## 2025-07-25 NOTE — TELEPHONE ENCOUNTER
Copied from CRM #[id number]. Topic: Appointments - Appointment Scheduling  >> Jul 25, 2025 12:03 PM Emily wrote:  Type: Appointment Access (office must schedule)    Who Called: Brittny  Reason for Visit: pregnancy confirmation  When does the patient need to be seen?:   Next Available Appointment:   Would the patient rather a call back or a response via [facility]? call back  Best Call Back Number:  [phone]  Additional Information: Patient has been puking and period is late and she had a positive pregnancy test at home. She would like to be seen to confirm pregnancy. She would like treatment for nausea. Please call back [phone]. She would like a call back from the [facility] office.

## 2025-07-25 NOTE — TELEPHONE ENCOUNTER
Pt called in regards to he had a positive pregnancy test at home. She would like to be seen to confirm pregnancy. She would like treatment for nausea. Advsied pt to report to  to be assessed Pregnancy confirmation appt scheduled 7/28       Maida LOPEZ LPN  OB/GYN

## 2025-07-28 ENCOUNTER — OFFICE VISIT (OUTPATIENT)
Dept: OBSTETRICS AND GYNECOLOGY | Facility: CLINIC | Age: 30
End: 2025-07-28
Payer: MEDICAID

## 2025-07-28 VITALS
DIASTOLIC BLOOD PRESSURE: 70 MMHG | BODY MASS INDEX: 41.78 KG/M2 | SYSTOLIC BLOOD PRESSURE: 110 MMHG | WEIGHT: 227.06 LBS | HEIGHT: 62 IN

## 2025-07-28 DIAGNOSIS — N91.2 AMENORRHEA: Primary | ICD-10-CM

## 2025-07-28 DIAGNOSIS — Z32.01 POSITIVE PREGNANCY TEST: ICD-10-CM

## 2025-07-28 DIAGNOSIS — F41.9 ANXIETY IN PREGNANCY IN FIRST TRIMESTER, ANTEPARTUM: ICD-10-CM

## 2025-07-28 DIAGNOSIS — O99.341 ANXIETY IN PREGNANCY IN FIRST TRIMESTER, ANTEPARTUM: ICD-10-CM

## 2025-07-28 DIAGNOSIS — N91.1 SECONDARY AMENORRHEA: ICD-10-CM

## 2025-07-28 DIAGNOSIS — O99.210 OBESITY IN PREGNANCY: ICD-10-CM

## 2025-07-28 LAB
B-HCG UR QL: POSITIVE
CTP QC/QA: YES

## 2025-07-28 PROCEDURE — 99999PBSHW POCT URINE PREGNANCY: Mod: PBBFAC,,,

## 2025-07-28 PROCEDURE — 99213 OFFICE O/P EST LOW 20 MIN: CPT | Mod: PBBFAC,TH | Performed by: ADVANCED PRACTICE MIDWIFE

## 2025-07-28 PROCEDURE — 81025 URINE PREGNANCY TEST: CPT | Mod: PBBFAC | Performed by: ADVANCED PRACTICE MIDWIFE

## 2025-07-28 PROCEDURE — 99214 OFFICE O/P EST MOD 30 MIN: CPT | Mod: S$PBB,TH,, | Performed by: ADVANCED PRACTICE MIDWIFE

## 2025-07-28 PROCEDURE — 99999 PR PBB SHADOW E&M-EST. PATIENT-LVL III: CPT | Mod: PBBFAC,,, | Performed by: ADVANCED PRACTICE MIDWIFE

## 2025-07-28 RX ORDER — LANOLIN ALCOHOL/MO/W.PET/CERES
50 CREAM (GRAM) TOPICAL DAILY
COMMUNITY

## 2025-07-29 PROBLEM — O99.341 ANXIETY IN PREGNANCY IN FIRST TRIMESTER, ANTEPARTUM: Status: ACTIVE | Noted: 2025-07-29

## 2025-07-29 PROBLEM — O99.210 OBESITY IN PREGNANCY: Status: ACTIVE | Noted: 2022-05-05

## 2025-07-29 PROBLEM — F41.9 ANXIETY IN PREGNANCY IN FIRST TRIMESTER, ANTEPARTUM: Status: ACTIVE | Noted: 2025-07-29
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
## 2025-08-12 ENCOUNTER — LAB VISIT (OUTPATIENT)
Dept: LAB | Facility: HOSPITAL | Age: 30
End: 2025-08-12
Attending: ADVANCED PRACTICE MIDWIFE
Payer: MEDICAID

## 2025-08-12 ENCOUNTER — PROCEDURE VISIT (OUTPATIENT)
Dept: OBSTETRICS AND GYNECOLOGY | Facility: CLINIC | Age: 30
End: 2025-08-12
Payer: MEDICAID

## 2025-08-12 DIAGNOSIS — Z32.01 POSITIVE PREGNANCY TEST: ICD-10-CM

## 2025-08-12 DIAGNOSIS — K59.00 CONSTIPATION, UNSPECIFIED CONSTIPATION TYPE: Primary | ICD-10-CM

## 2025-08-12 DIAGNOSIS — O21.9 NAUSEA AND VOMITING IN PREGNANCY: ICD-10-CM

## 2025-08-12 DIAGNOSIS — N91.1 SECONDARY AMENORRHEA: ICD-10-CM

## 2025-08-12 DIAGNOSIS — Z32.01 POSITIVE PREGNANCY TEST: Primary | ICD-10-CM

## 2025-08-12 LAB
ALBUMIN SERPL BCP-MCNC: 4.1 G/DL (ref 3.5–5.2)
ALP SERPL-CCNC: 98 UNIT/L (ref 40–150)
ALT SERPL W/O P-5'-P-CCNC: 15 UNIT/L (ref 0–55)
ANION GAP (OHS): 8 MMOL/L (ref 8–16)
AST SERPL-CCNC: 18 UNIT/L (ref 0–50)
BILIRUB SERPL-MCNC: 0.2 MG/DL (ref 0.1–1)
BUN SERPL-MCNC: 6 MG/DL (ref 6–20)
CALCIUM SERPL-MCNC: 9.1 MG/DL (ref 8.7–10.5)
CHLORIDE SERPL-SCNC: 106 MMOL/L (ref 95–110)
CO2 SERPL-SCNC: 21 MMOL/L (ref 23–29)
CREAT SERPL-MCNC: 0.6 MG/DL (ref 0.5–1.4)
EAG (OHS): 97 MG/DL (ref 68–131)
ERYTHROCYTE [DISTWIDTH] IN BLOOD BY AUTOMATED COUNT: 13.9 % (ref 11.5–14.5)
GFR SERPLBLD CREATININE-BSD FMLA CKD-EPI: >60 ML/MIN/1.73/M2
GLUCOSE SERPL-MCNC: 107 MG/DL (ref 70–110)
HAV IGM SERPL QL IA: NORMAL
HBA1C MFR BLD: 5 % (ref 4–5.6)
HBV CORE IGM SERPL QL IA: NORMAL
HBV SURFACE AG SERPL QL IA: NORMAL
HCT VFR BLD AUTO: 43.1 % (ref 37–48.5)
HCV AB SERPL QL IA: NORMAL
HGB BLD-MCNC: 13.8 GM/DL (ref 12–16)
HIV 1+2 AB+HIV1 P24 AG SERPL QL IA: NORMAL
INDIRECT COOMBS: NORMAL
MCH RBC QN AUTO: 27.9 PG (ref 27–31)
MCHC RBC AUTO-ENTMCNC: 32 G/DL (ref 32–36)
MCV RBC AUTO: 87 FL (ref 82–98)
PLATELET # BLD AUTO: 300 K/UL (ref 150–450)
PMV BLD AUTO: 10.8 FL (ref 9.2–12.9)
POTASSIUM SERPL-SCNC: 4.1 MMOL/L (ref 3.5–5.1)
PROT SERPL-MCNC: 7.3 GM/DL (ref 6–8.4)
RBC # BLD AUTO: 4.94 M/UL (ref 4–5.4)
RH BLD: NORMAL
SODIUM SERPL-SCNC: 135 MMOL/L (ref 136–145)
SPECIMEN OUTDATE: NORMAL
T PALLIDUM IGG+IGM SER QL: NORMAL
WBC # BLD AUTO: 12.08 K/UL (ref 3.9–12.7)

## 2025-08-12 PROCEDURE — 83036 HEMOGLOBIN GLYCOSYLATED A1C: CPT

## 2025-08-12 PROCEDURE — 80053 COMPREHEN METABOLIC PANEL: CPT

## 2025-08-12 PROCEDURE — 86762 RUBELLA ANTIBODY: CPT

## 2025-08-12 PROCEDURE — 85027 COMPLETE CBC AUTOMATED: CPT

## 2025-08-12 PROCEDURE — 36415 COLL VENOUS BLD VENIPUNCTURE: CPT

## 2025-08-12 PROCEDURE — 86901 BLOOD TYPING SEROLOGIC RH(D): CPT | Performed by: ADVANCED PRACTICE MIDWIFE

## 2025-08-12 PROCEDURE — 80074 ACUTE HEPATITIS PANEL: CPT

## 2025-08-12 PROCEDURE — 87389 HIV-1 AG W/HIV-1&-2 AB AG IA: CPT

## 2025-08-12 PROCEDURE — 86593 SYPHILIS TEST NON-TREP QUANT: CPT

## 2025-08-12 RX ORDER — PROMETHAZINE HYDROCHLORIDE 12.5 MG/1
12.5 TABLET ORAL 4 TIMES DAILY
Qty: 30 TABLET | Refills: 3 | Status: SHIPPED | OUTPATIENT
Start: 2025-08-12

## 2025-08-12 RX ORDER — ONDANSETRON 8 MG/1
8 TABLET, ORALLY DISINTEGRATING ORAL 2 TIMES DAILY
Qty: 30 TABLET | Refills: 3 | Status: SHIPPED | OUTPATIENT
Start: 2025-08-12

## 2025-08-12 RX ORDER — DOCUSATE SODIUM 100 MG/1
100 CAPSULE, LIQUID FILLED ORAL 3 TIMES DAILY PRN
Qty: 90 CAPSULE | Refills: 1 | Status: SHIPPED | OUTPATIENT
Start: 2025-08-12

## 2025-08-13 LAB
RUBV IGG SER-ACNC: 367 IU/ML
RUBV IGG SER-IMP: REACTIVE

## 2025-08-19 ENCOUNTER — INITIAL PRENATAL (OUTPATIENT)
Dept: OBSTETRICS AND GYNECOLOGY | Facility: CLINIC | Age: 30
End: 2025-08-19
Payer: MEDICAID

## 2025-08-19 ENCOUNTER — PROCEDURE VISIT (OUTPATIENT)
Dept: OBSTETRICS AND GYNECOLOGY | Facility: CLINIC | Age: 30
End: 2025-08-19
Payer: MEDICAID

## 2025-08-19 VITALS — SYSTOLIC BLOOD PRESSURE: 118 MMHG | WEIGHT: 226 LBS | DIASTOLIC BLOOD PRESSURE: 74 MMHG | BODY MASS INDEX: 41.33 KG/M2

## 2025-08-19 DIAGNOSIS — Z32.01 POSITIVE PREGNANCY TEST: ICD-10-CM

## 2025-08-19 DIAGNOSIS — F41.9 ANXIETY IN PREGNANCY IN FIRST TRIMESTER, ANTEPARTUM: ICD-10-CM

## 2025-08-19 DIAGNOSIS — O99.341 ANXIETY IN PREGNANCY IN FIRST TRIMESTER, ANTEPARTUM: ICD-10-CM

## 2025-08-19 DIAGNOSIS — O99.210 OBESITY IN PREGNANCY: Primary | ICD-10-CM

## 2025-08-19 PROCEDURE — 99214 OFFICE O/P EST MOD 30 MIN: CPT | Mod: TH,S$PBB,, | Performed by: ADVANCED PRACTICE MIDWIFE

## 2025-08-19 PROCEDURE — 76801 OB US < 14 WKS SINGLE FETUS: CPT | Mod: PBBFAC | Performed by: OBSTETRICS & GYNECOLOGY

## 2025-08-19 PROCEDURE — 99212 OFFICE O/P EST SF 10 MIN: CPT | Mod: PBBFAC,TH,25 | Performed by: ADVANCED PRACTICE MIDWIFE

## 2025-08-19 PROCEDURE — 99999 PR PBB SHADOW E&M-EST. PATIENT-LVL II: CPT | Mod: PBBFAC,,, | Performed by: ADVANCED PRACTICE MIDWIFE

## 2025-08-25 ENCOUNTER — PATIENT MESSAGE (OUTPATIENT)
Dept: OBSTETRICS AND GYNECOLOGY | Facility: CLINIC | Age: 30
End: 2025-08-25
Payer: MEDICAID